# Patient Record
Sex: FEMALE | Race: OTHER | NOT HISPANIC OR LATINO
[De-identification: names, ages, dates, MRNs, and addresses within clinical notes are randomized per-mention and may not be internally consistent; named-entity substitution may affect disease eponyms.]

---
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
## 2019-03-07 ENCOUNTER — TRANSCRIPTION ENCOUNTER (OUTPATIENT)
Age: 19
End: 2019-03-07

## 2019-11-14 ENCOUNTER — TRANSCRIPTION ENCOUNTER (OUTPATIENT)
Age: 19
End: 2019-11-14

## 2019-11-17 ENCOUNTER — TRANSCRIPTION ENCOUNTER (OUTPATIENT)
Age: 19
End: 2019-11-17

## 2019-12-06 ENCOUNTER — TRANSCRIPTION ENCOUNTER (OUTPATIENT)
Age: 19
End: 2019-12-06

## 2020-01-16 ENCOUNTER — TRANSCRIPTION ENCOUNTER (OUTPATIENT)
Age: 20
End: 2020-01-16

## 2020-03-09 ENCOUNTER — TRANSCRIPTION ENCOUNTER (OUTPATIENT)
Age: 20
End: 2020-03-09

## 2020-05-21 ENCOUNTER — INPATIENT (INPATIENT)
Facility: HOSPITAL | Age: 20
LOS: 6 days | Discharge: HOME | DRG: 871 | End: 2020-05-28
Attending: HOSPITALIST | Admitting: INTERNAL MEDICINE
Payer: COMMERCIAL

## 2020-05-21 VITALS
OXYGEN SATURATION: 98 % | RESPIRATION RATE: 16 BRPM | SYSTOLIC BLOOD PRESSURE: 110 MMHG | DIASTOLIC BLOOD PRESSURE: 62 MMHG | TEMPERATURE: 99 F | HEART RATE: 110 BPM

## 2020-05-21 DIAGNOSIS — J15.9 UNSPECIFIED BACTERIAL PNEUMONIA: ICD-10-CM

## 2020-05-21 DIAGNOSIS — R11.10 VOMITING, UNSPECIFIED: ICD-10-CM

## 2020-05-21 DIAGNOSIS — A41.9 SEPSIS, UNSPECIFIED ORGANISM: ICD-10-CM

## 2020-05-21 DIAGNOSIS — R19.7 DIARRHEA, UNSPECIFIED: ICD-10-CM

## 2020-05-21 DIAGNOSIS — E87.3 ALKALOSIS: ICD-10-CM

## 2020-05-21 DIAGNOSIS — Z29.9 ENCOUNTER FOR PROPHYLACTIC MEASURES, UNSPECIFIED: ICD-10-CM

## 2020-05-21 DIAGNOSIS — Z90.89 ACQUIRED ABSENCE OF OTHER ORGANS: Chronic | ICD-10-CM

## 2020-05-21 DIAGNOSIS — R94.31 ABNORMAL ELECTROCARDIOGRAM [ECG] [EKG]: ICD-10-CM

## 2020-05-21 DIAGNOSIS — U07.1 COVID-19: ICD-10-CM

## 2020-05-21 DIAGNOSIS — F19.10 OTHER PSYCHOACTIVE SUBSTANCE ABUSE, UNCOMPLICATED: ICD-10-CM

## 2020-05-21 DIAGNOSIS — R63.8 OTHER SYMPTOMS AND SIGNS CONCERNING FOOD AND FLUID INTAKE: ICD-10-CM

## 2020-05-21 DIAGNOSIS — F41.9 ANXIETY DISORDER, UNSPECIFIED: ICD-10-CM

## 2020-05-21 LAB
ALBUMIN SERPL ELPH-MCNC: 3.5 G/DL — SIGNIFICANT CHANGE UP (ref 3.3–5)
ALP SERPL-CCNC: 91 U/L — SIGNIFICANT CHANGE UP (ref 40–120)
ALT FLD-CCNC: <5 U/L — LOW (ref 10–45)
ANION GAP SERPL CALC-SCNC: 16 MMOL/L — SIGNIFICANT CHANGE UP (ref 5–17)
APPEARANCE UR: CLEAR — SIGNIFICANT CHANGE UP
AST SERPL-CCNC: 21 U/L — SIGNIFICANT CHANGE UP (ref 10–40)
BASOPHILS # BLD AUTO: 0.02 K/UL — SIGNIFICANT CHANGE UP (ref 0–0.2)
BASOPHILS NFR BLD AUTO: 0.1 % — SIGNIFICANT CHANGE UP (ref 0–2)
BILIRUB SERPL-MCNC: 0.4 MG/DL — SIGNIFICANT CHANGE UP (ref 0.2–1.2)
BILIRUB UR-MCNC: NEGATIVE — SIGNIFICANT CHANGE UP
BUN SERPL-MCNC: 5 MG/DL — LOW (ref 7–23)
CALCIUM SERPL-MCNC: 9 MG/DL — SIGNIFICANT CHANGE UP (ref 8.4–10.5)
CHLORIDE SERPL-SCNC: 100 MMOL/L — SIGNIFICANT CHANGE UP (ref 96–108)
CO2 SERPL-SCNC: 18 MMOL/L — LOW (ref 22–31)
COLOR SPEC: YELLOW — SIGNIFICANT CHANGE UP
CREAT SERPL-MCNC: 0.65 MG/DL — SIGNIFICANT CHANGE UP (ref 0.5–1.3)
CRP SERPL-MCNC: 27.72 MG/DL — HIGH (ref 0–0.4)
D DIMER BLD IA.RAPID-MCNC: 479 NG/ML DDU — HIGH
DIFF PNL FLD: ABNORMAL
EOSINOPHIL # BLD AUTO: 0 K/UL — SIGNIFICANT CHANGE UP (ref 0–0.5)
EOSINOPHIL NFR BLD AUTO: 0 % — SIGNIFICANT CHANGE UP (ref 0–6)
FERRITIN SERPL-MCNC: 360 NG/ML — HIGH (ref 15–150)
GLUCOSE SERPL-MCNC: 88 MG/DL — SIGNIFICANT CHANGE UP (ref 70–99)
GLUCOSE UR QL: NEGATIVE — SIGNIFICANT CHANGE UP
HCG SERPL-ACNC: <0 MIU/ML — SIGNIFICANT CHANGE UP
HCT VFR BLD CALC: 37.4 % — SIGNIFICANT CHANGE UP (ref 34.5–45)
HGB BLD-MCNC: 12.5 G/DL — SIGNIFICANT CHANGE UP (ref 11.5–15.5)
IMM GRANULOCYTES NFR BLD AUTO: 0.6 % — SIGNIFICANT CHANGE UP (ref 0–1.5)
KETONES UR-MCNC: >=80 MG/DL
LACTATE SERPL-SCNC: 0.7 MMOL/L — SIGNIFICANT CHANGE UP (ref 0.5–2)
LEGIONELLA AG UR QL: NEGATIVE — SIGNIFICANT CHANGE UP
LEUKOCYTE ESTERASE UR-ACNC: NEGATIVE — SIGNIFICANT CHANGE UP
LIDOCAIN IGE QN: 11 U/L — SIGNIFICANT CHANGE UP (ref 7–60)
LYMPHOCYTES # BLD AUTO: 0.87 K/UL — LOW (ref 1–3.3)
LYMPHOCYTES # BLD AUTO: 4.8 % — LOW (ref 13–44)
MCHC RBC-ENTMCNC: 30.9 PG — SIGNIFICANT CHANGE UP (ref 27–34)
MCHC RBC-ENTMCNC: 33.4 GM/DL — SIGNIFICANT CHANGE UP (ref 32–36)
MCV RBC AUTO: 92.3 FL — SIGNIFICANT CHANGE UP (ref 80–100)
MONOCYTES # BLD AUTO: 0.35 K/UL — SIGNIFICANT CHANGE UP (ref 0–0.9)
MONOCYTES NFR BLD AUTO: 1.9 % — LOW (ref 2–14)
NEUTROPHILS # BLD AUTO: 16.95 K/UL — HIGH (ref 1.8–7.4)
NEUTROPHILS NFR BLD AUTO: 92.6 % — HIGH (ref 43–77)
NITRITE UR-MCNC: NEGATIVE — SIGNIFICANT CHANGE UP
NRBC # BLD: 0 /100 WBCS — SIGNIFICANT CHANGE UP (ref 0–0)
PH UR: 6 — SIGNIFICANT CHANGE UP (ref 5–8)
PLATELET # BLD AUTO: 297 K/UL — SIGNIFICANT CHANGE UP (ref 150–400)
POTASSIUM SERPL-MCNC: 3.6 MMOL/L — SIGNIFICANT CHANGE UP (ref 3.5–5.3)
POTASSIUM SERPL-SCNC: 3.6 MMOL/L — SIGNIFICANT CHANGE UP (ref 3.5–5.3)
PROCALCITONIN SERPL-MCNC: 0.29 NG/ML — HIGH (ref 0.02–0.1)
PROT SERPL-MCNC: 7 G/DL — SIGNIFICANT CHANGE UP (ref 6–8.3)
PROT UR-MCNC: ABNORMAL MG/DL
RAPID RVP RESULT: SIGNIFICANT CHANGE UP
RBC # BLD: 4.05 M/UL — SIGNIFICANT CHANGE UP (ref 3.8–5.2)
RBC # FLD: 12.2 % — SIGNIFICANT CHANGE UP (ref 10.3–14.5)
SARS-COV-2 RNA SPEC QL NAA+PROBE: SIGNIFICANT CHANGE UP
SODIUM SERPL-SCNC: 134 MMOL/L — LOW (ref 135–145)
SP GR SPEC: >=1.03 — SIGNIFICANT CHANGE UP (ref 1–1.03)
UROBILINOGEN FLD QL: 0.2 E.U./DL — SIGNIFICANT CHANGE UP
WBC # BLD: 18.3 K/UL — HIGH (ref 3.8–10.5)
WBC # FLD AUTO: 18.3 K/UL — HIGH (ref 3.8–10.5)

## 2020-05-21 PROCEDURE — 99223 1ST HOSP IP/OBS HIGH 75: CPT | Mod: GC

## 2020-05-21 PROCEDURE — 71045 X-RAY EXAM CHEST 1 VIEW: CPT | Mod: 26

## 2020-05-21 PROCEDURE — 99285 EMERGENCY DEPT VISIT HI MDM: CPT

## 2020-05-21 PROCEDURE — 93010 ELECTROCARDIOGRAM REPORT: CPT

## 2020-05-21 RX ORDER — PANTOPRAZOLE SODIUM 20 MG/1
40 TABLET, DELAYED RELEASE ORAL ONCE
Refills: 0 | Status: COMPLETED | OUTPATIENT
Start: 2020-05-21 | End: 2020-05-21

## 2020-05-21 RX ORDER — CEFTRIAXONE 500 MG/1
1000 INJECTION, POWDER, FOR SOLUTION INTRAMUSCULAR; INTRAVENOUS EVERY 24 HOURS
Refills: 0 | Status: DISCONTINUED | OUTPATIENT
Start: 2020-05-22 | End: 2020-05-22

## 2020-05-21 RX ORDER — BENZOCAINE AND MENTHOL 5; 1 G/100ML; G/100ML
1 LIQUID ORAL ONCE
Refills: 0 | Status: COMPLETED | OUTPATIENT
Start: 2020-05-21 | End: 2020-05-21

## 2020-05-21 RX ORDER — GUAIFENESIN/DEXTROMETHORPHAN 600MG-30MG
10 TABLET, EXTENDED RELEASE 12 HR ORAL EVERY 6 HOURS
Refills: 0 | Status: DISCONTINUED | OUTPATIENT
Start: 2020-05-21 | End: 2020-05-22

## 2020-05-21 RX ORDER — ONDANSETRON 8 MG/1
4 TABLET, FILM COATED ORAL EVERY 8 HOURS
Refills: 0 | Status: DISCONTINUED | OUTPATIENT
Start: 2020-05-21 | End: 2020-05-22

## 2020-05-21 RX ORDER — AZITHROMYCIN 500 MG/1
250 TABLET, FILM COATED ORAL EVERY 24 HOURS
Refills: 0 | Status: DISCONTINUED | OUTPATIENT
Start: 2020-05-22 | End: 2020-05-22

## 2020-05-21 RX ORDER — CEFTRIAXONE 500 MG/1
1000 INJECTION, POWDER, FOR SOLUTION INTRAMUSCULAR; INTRAVENOUS ONCE
Refills: 0 | Status: COMPLETED | OUTPATIENT
Start: 2020-05-21 | End: 2020-05-21

## 2020-05-21 RX ORDER — SODIUM CHLORIDE 9 MG/ML
1000 INJECTION, SOLUTION INTRAVENOUS
Refills: 0 | Status: DISCONTINUED | OUTPATIENT
Start: 2020-05-21 | End: 2020-05-22

## 2020-05-21 RX ORDER — LANOLIN ALCOHOL/MO/W.PET/CERES
3 CREAM (GRAM) TOPICAL AT BEDTIME
Refills: 0 | Status: DISCONTINUED | OUTPATIENT
Start: 2020-05-21 | End: 2020-05-24

## 2020-05-21 RX ORDER — FAMOTIDINE 10 MG/ML
20 INJECTION INTRAVENOUS ONCE
Refills: 0 | Status: COMPLETED | OUTPATIENT
Start: 2020-05-21 | End: 2020-05-21

## 2020-05-21 RX ORDER — KETOROLAC TROMETHAMINE 30 MG/ML
30 SYRINGE (ML) INJECTION ONCE
Refills: 0 | Status: DISCONTINUED | OUTPATIENT
Start: 2020-05-21 | End: 2020-05-21

## 2020-05-21 RX ORDER — CLONAZEPAM 1 MG
1 TABLET ORAL DAILY
Refills: 0 | Status: DISCONTINUED | OUTPATIENT
Start: 2020-05-21 | End: 2020-05-27

## 2020-05-21 RX ORDER — ACETAMINOPHEN 500 MG
1000 TABLET ORAL ONCE
Refills: 0 | Status: DISCONTINUED | OUTPATIENT
Start: 2020-05-21 | End: 2020-05-21

## 2020-05-21 RX ORDER — ENOXAPARIN SODIUM 100 MG/ML
40 INJECTION SUBCUTANEOUS EVERY 24 HOURS
Refills: 0 | Status: DISCONTINUED | OUTPATIENT
Start: 2020-05-21 | End: 2020-05-22

## 2020-05-21 RX ORDER — SODIUM CHLORIDE 9 MG/ML
2000 INJECTION INTRAMUSCULAR; INTRAVENOUS; SUBCUTANEOUS ONCE
Refills: 0 | Status: COMPLETED | OUTPATIENT
Start: 2020-05-21 | End: 2020-05-21

## 2020-05-21 RX ORDER — ONDANSETRON 8 MG/1
4 TABLET, FILM COATED ORAL ONCE
Refills: 0 | Status: COMPLETED | OUTPATIENT
Start: 2020-05-21 | End: 2020-05-21

## 2020-05-21 RX ORDER — IBUPROFEN 200 MG
600 TABLET ORAL ONCE
Refills: 0 | Status: DISCONTINUED | OUTPATIENT
Start: 2020-05-21 | End: 2020-05-21

## 2020-05-21 RX ORDER — ESCITALOPRAM OXALATE 10 MG/1
20 TABLET, FILM COATED ORAL EVERY 24 HOURS
Refills: 0 | Status: DISCONTINUED | OUTPATIENT
Start: 2020-05-21 | End: 2020-05-22

## 2020-05-21 RX ORDER — AZITHROMYCIN 500 MG/1
500 TABLET, FILM COATED ORAL ONCE
Refills: 0 | Status: COMPLETED | OUTPATIENT
Start: 2020-05-21 | End: 2020-05-21

## 2020-05-21 RX ADMIN — Medication 30 MILLIGRAM(S): at 15:01

## 2020-05-21 RX ADMIN — Medication 3 MILLIGRAM(S): at 21:25

## 2020-05-21 RX ADMIN — ENOXAPARIN SODIUM 40 MILLIGRAM(S): 100 INJECTION SUBCUTANEOUS at 17:36

## 2020-05-21 RX ADMIN — AZITHROMYCIN 255 MILLIGRAM(S): 500 TABLET, FILM COATED ORAL at 15:53

## 2020-05-21 RX ADMIN — Medication 0.5 MILLIGRAM(S): at 15:01

## 2020-05-21 RX ADMIN — Medication 204 MILLIGRAM(S): at 16:26

## 2020-05-21 RX ADMIN — PANTOPRAZOLE SODIUM 40 MILLIGRAM(S): 20 TABLET, DELAYED RELEASE ORAL at 15:01

## 2020-05-21 RX ADMIN — ONDANSETRON 4 MILLIGRAM(S): 8 TABLET, FILM COATED ORAL at 12:57

## 2020-05-21 RX ADMIN — SODIUM CHLORIDE 90 MILLILITER(S): 9 INJECTION, SOLUTION INTRAVENOUS at 18:23

## 2020-05-21 RX ADMIN — SODIUM CHLORIDE 1000 MILLILITER(S): 9 INJECTION INTRAMUSCULAR; INTRAVENOUS; SUBCUTANEOUS at 12:56

## 2020-05-21 RX ADMIN — Medication 1 MILLIGRAM(S): at 22:24

## 2020-05-21 RX ADMIN — FAMOTIDINE 20 MILLIGRAM(S): 10 INJECTION INTRAVENOUS at 15:00

## 2020-05-21 RX ADMIN — BENZOCAINE AND MENTHOL 1 LOZENGE: 5; 1 LIQUID ORAL at 22:22

## 2020-05-21 RX ADMIN — Medication 100 MILLIGRAM(S): at 22:22

## 2020-05-21 RX ADMIN — CEFTRIAXONE 1000 MILLIGRAM(S): 500 INJECTION, POWDER, FOR SOLUTION INTRAMUSCULAR; INTRAVENOUS at 15:00

## 2020-05-21 RX ADMIN — CEFTRIAXONE 100 MILLIGRAM(S): 500 INJECTION, POWDER, FOR SOLUTION INTRAMUSCULAR; INTRAVENOUS at 14:21

## 2020-05-21 NOTE — H&P ADULT - ASSESSMENT
19 y/o F PMHx of Anxiety and Depression presents to the ED with fever, cough, chills, running nose, nausea, diarrhea, abd pain since past Monday - patient meeting Sepsis Criteria, source being attributed to pneumonia, possibly covid-19 with a superimposed bacterial process. Admitted to [facility] for further workup. 19 y/o F PMHx of Anxiety and Depression presents to the ED with fever, cough, chills, running nose, nausea, diarrhea, abd pain since past Monday - patient meeting Sepsis Criteria, source being attributed to pneumonia, possibly covid-19 with a superimposed bacterial process. Admitted to [facility] for further workup.

## 2020-05-21 NOTE — ED ADULT NURSE REASSESSMENT NOTE - NS ED NURSE REASSESS RESPONSE TO CARE
pt denies change in nausea from zofran administration/no change in status or condition
pt now speaking 5-10 words per breath, up from 2-4 earlier today/no change in status or condition

## 2020-05-21 NOTE — ED PROVIDER NOTE - PHYSICAL EXAMINATION
General: Patient is well developed and well nourished. Patient is alert and oriented to person, place and date. Patient is laying comfortably in stretcher and appears in no acute distress.  HEENT: Head is normocephalic and atraumatic. Pupils are equal, round and reactive to light and accommodation. Extraocular movements intact. No evidence of nystagmus, conjunctival injection, or scleral icterus. External ears symmetric and non-tender without evidence of discharge. Ear canals are clear without evidence of edema or erythema. Cone of light evident on tympanic membrade without evidence of erythema, retraction or bulge. No hemotympanum present bilaterally.  Nose is symmetric, non-tender, patent without evidence of discharge. Teeth in good repair. Uvula midline. Pharynx without erythema, edema, tonsillar enlargement or exudates.   Neck: Supple and nontender, with no evidence of lymphadenopathy. No cervical spinal tenderness. Full range of motion.  Heart: Regular rate and rhythm. No murmurs, rubs or gallops.   Lungs: Clear to auscultation bilaterally with equal chest expansion. No note of wheezes, rhonchi, rales. Equal chest expansion. No note of retractions.  Abdomen: Bowel sounds present in all four quadrants. Soft, non-tender, non-distended without signs of masses, rebound or guarding. No note of hepatosplenomegaly. No CVA tenderness bilaterally. Negative Salmeron sign. No pain present over McBurney's point.  Musculoskeletal: No edema, erythema, ecchymosis, atrophy or deformity. Full range of motion in all four extremities.  No clubbing or cyanosis. No point tenderness to palpation.   Neuro: Cranial nerves intact. GCS 15. Moving all extremities without discomfort. Sensation intact. Gait steady  Skin: Warm, dry and intact without evidence of rashes, bruising, pallor, jaundice or cyanosis.   Psych: Mood and affect appropriate. General: Patient is well developed and well nourished. Patient is alert and oriented to person, place and date. Patient is laying comfortably in stretcher and appears in no acute distress.  HEENT: Head is normocephalic and atraumatic. Pupils are equal, round and reactive to light and accommodation. Extraocular movements intact. No evidence of nystagmus, conjunctival injection, or scleral icterus. External ears symmetric and non-tender without evidence of discharge. Ear canals are clear without evidence of edema or erythema. Cone of light evident on tympanic membrane without evidence of erythema, retraction or bulge. No hemotympanum present bilaterally.  Nose is symmetric, non-tender, patent without evidence of discharge. Teeth in good repair. Uvula midline. Pharynx without erythema, edema, tonsillar enlargement or exudates.   Neck: Supple and nontender, with no evidence of lymphadenopathy. No cervical spinal tenderness. Full range of motion.  Heart: Regular rate and rhythm. No murmurs, rubs or gallops.   Lungs: Clear to auscultation bilaterally with equal chest expansion. No note of wheezes, rhonchi, rales. Equal chest expansion. No note of retractions.  Abdomen: Bowel sounds present in all four quadrants. Soft, non-tender, non-distended without signs of masses, rebound or guarding. No note of hepatosplenomegaly. No CVA tenderness bilaterally. Negative Salmeron sign. No pain present over McBurney's point.  Musculoskeletal: No edema, erythema, ecchymosis, atrophy or deformity. Full range of motion in all four extremities.  No clubbing or cyanosis. No point tenderness to palpation.   Neuro: Cranial nerves intact. GCS 15. Moving all extremities without discomfort. Sensation intact. Gait steady  Skin: Warm, dry and intact without evidence of rashes, bruising, pallor, jaundice or cyanosis.   Psych: Mood and affect appropriate.

## 2020-05-21 NOTE — H&P ADULT - PROBLEM SELECTOR PLAN 9
Prophylaxis: Lovenox 40mg sq daily  Dispo: COVID-RMF, pending r/o Hx of substance use with Marijuana. ISTOP - notable for hycodan and 2 orders of codeine filled recently in March.  - f/u urine toxicology

## 2020-05-21 NOTE — ED PROVIDER NOTE - OBJECTIVE STATEMENT
19 y/o F, PMHx of anxiety and depression presents to the ED with fever, cough, chills, running nose, nausea, diarrhea, abd pain since past Monday. Pt was seen at [facility] yesterday, had blood work, chest x-ray, CT of abd/pelvis, and diagnosed with pneumonia on both sides, COVID-19 swab was negative. Pt was discharged on abx, taking nausea medications. Today was 1st day she was able to tolerate fluid by mouth but continues to have diarrhea. No bloody/tarry stools. Has generalized abd pain and SOB, requesting Klonopin and oxygen. 21 y/o F, PMHx of anxiety and depression presents to the ED with fever, cough, chills, running nose, nausea, diarrhea, abd pain since past Monday. Pt was seen at [facility] yesterday, had blood work, chest x-ray, CT of abd/pelvis, and diagnosed with pneumonia on both sides, COVID-19 swab was negative. Pt was discharged on abx (augmentin and doxy) taking nausea medications. Today was 1st day she was able to tolerate fluid by mouth but continues to have diarrhea. No bloody/tarry stools. Has generalized abd pain and SOB, requesting Klonopin and oxygen. 21 y/o F, PMHx of anxiety and depression presents to the ED with fever, cough, chills, running nose, nausea, diarrhea, abd pain since past Monday. no sick contacts.  states she has not been out of the house. Pt was seen at [facility] yesterday, had blood work, chest x-ray, CT of abd/pelvis, and diagnosed with pneumonia on both sides, COVID-19 swab was negative. Pt was discharged on abx (augmentin and doxy) taking nausea medications (zofran). Today was 1st day she was able to tolerate fluid by mouth but continues to have diarrhea described as complete liquid. No bloody/tarry stools no change in urinary habits, no burning with urination. Has generalized abd pain and SOB feeling as if she cannot take a deep breath due to pain (described as . no chest pain palpitations no ick contacts

## 2020-05-21 NOTE — H&P ADULT - PROBLEM SELECTOR PLAN 2
- Patient presenting with 1 week of respiratory and gastrointestinal symptoms along with tactile fevers/chills.   - Patient was seen in [place] ED prior to admission and d/jesi with antibiotics. COVID-19 swab negative yesterday and also negative (prelim) today in ED.   - F/u final Covid-19 result   - Based on presenting symptoms and labwork (with lymphopenia and high CRP 27), can not fully exclude an acute coronavirus infection.  - CXR with focal left lower lobe infiltrate and CP blunting is more favorable of a bacterial process   - At this time, will treat patient as potential viral covid-19 infection with superimposed bacterial pneumonia.   - Patient is on room air with sat high 90s   - No treatment being pursued at this time given no O2 requirement.   - No toci at this time in light of bacterial process - Patient presenting with 1 week of respiratory and gastrointestinal symptoms along with tactile fevers/chills.   - Patient was seen in [place] ED prior to admission and d/jesi with antibiotics. COVID-19 swab negative yesterday and also negative (prelim) today in ED.   - Based on presenting symptoms and labwork (with lymphopenia and high CRP 27), can not fully exclude an acute coronavirus infection.  - F/u final Covid-19 result   - CXR with focal left lower lobe infiltrate and CP blunting is more favorable of a bacterial process   - At this time, will treat patient as potential viral covid-19 infection with superimposed bacterial pneumonia.   - Patient is on room air with sat high 90s   - No treatment being pursued at this time given no O2 requirement.   - No toci at this time in light of bacterial process

## 2020-05-21 NOTE — H&P ADULT - PROBLEM SELECTOR PLAN 8
F: None  E: Replete PRN  N: CLD  Prophylaxis: None, IMPROVE score 0  Dispo: COVID-RMF, pending r/o F: None  E: Replete PRN  N: CLD T wave inversion noted in EKG.  - f/u repeat EKG T wave inversion noted in EKG.  - f/u repeat EKG AM

## 2020-05-21 NOTE — H&P ADULT - PROBLEM SELECTOR PLAN 3
-   - CXR revealing of a left lower lobe consolidative process with a blunted CP angle that may be reflective of an associated effusion.   - At this time, will cover patient with Cef/Azithro for a period of 7 days and de-escalate as necessary   - F/u RVP to rule out atypical causes of pneumonia   - F/u Urine legionella and Strep Pneumo   - Consider D/cing Azithro if atypical causes are ruled out   - Obtain sputum culture if patient with productive cough and narrow accordingly - CXR revealing of a left lower lobe consolidative process with a blunted CP angle that may be reflective of an associated effusion. At this time, cannot exclude viral pneumonia  - At this time, will cover patient with Cef/Azithro for a period of 7 days and de-escalate as necessary   - F/u RVP to rule out atypical causes of pneumonia   - F/u Urine legionella and Strep Pneumo   - Consider D/cing Azithro if atypical causes are ruled out   - Obtain sputum culture if patient with productive cough and narrow accordingly

## 2020-05-21 NOTE — ED ADULT NURSE NOTE - OTHER COMPLAINTS
pt c.o cough, sob, fever since sunday. denies sick contacts.
Pt. received semisupine in bed. NAD. +C/D/I Dressing to Left Knee, +Heplock, +Accordion Drain. PT present bedside to perform PT evaluation in conjunction w/ OT evaluation.

## 2020-05-21 NOTE — H&P ADULT - NSHPSOCIALHISTORY_GEN_ALL_CORE
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

## 2020-05-21 NOTE — ED ADULT NURSE NOTE - NSFALLRSKUNASSIST_ED_ALL_ED
Patient states she felt better while on medrol dosepak,pain is returning. Would like to try meloxicam as indicated in last visit note.Asking for script to be sent to  [facility].    no

## 2020-05-21 NOTE — H&P ADULT - HISTORY OF PRESENT ILLNESS
21 y/o F, PMHx of anxiety and depression presents to the ED with fever, cough, chills, running nose, nausea, diarrhea, abd pain since past Monday    In the ED, vitals as follows: T: Afebrile | HR: 74-110bpm | BP: /52-62mmHg | RR: 16-18/min | SpO2: 98% RA   Labs significant for: WBC 18.3 (92% Neut Pred, Lymphopenia 4.8%), ESR 68, CRP 27, Lactate 0.7, Procal 0.29, D-dimer 479, Ferritin 360, Na 134, Bicarbonate 18, UA [High SG > 1.030, (+) ketones, (-) for infection]. COVID-19 (-). Pending RVP.   Imaging: CXR - Patchy consolidation left lower lobe likely pneumonia, with blunted CP angle.   EKG:     Patient was administered: Cef/Azithro, 2L NS, 0.5mg Ativan x1, IV Pepcid/4mg IV Zofran, 40mg IV Pantoprazole, 25mg Phenergan, and 30mg IV Ketorolac. 19 yo F with PMHx of anxiety and depression presents to the ED with fever, cough, chills, running nose, nausea, diarrhea, abd pain for four days. Patient has been self isolating and home and denies sick contacts. Of note, patient was seen at [facility] 5/20 and was worked up, COVID (-), dx with bilateral PNA, d/c'ed on augmentin/doxycyclin and zofran for nausea. However, patient has had difficulty tolerating PO and has only been able to tolerate some fluid today. c/o diarrhea described as complete liquid, denies blood or mucous. Review of systems is positive for nausea, SOB with some inspiratory tenderness sternal area with deep breath, abd pain, and diarrhea.    ED Vitals: T: Afebrile | HR: 74-110bpm | BP: /52-62mmHg | RR: 16-18/min | SpO2: 98% RA  ED Course: CXR: Patchy consolidation left lower lobe likely pneumonia. UA: negative. Procal 0.29. WBC: 18.3 (92.6% neutrophils). CRP 27.7. Ferritin 360. COVID Negative (prelim). Received 2L NS, zofran x1, 0.5mg ativan PO, toradol x1, pepcid x1, protonix x1, 25mg Phenergan, ceftriaxone 1g and azithromycin. 19 yo F with PMHx of anxiety and depression presents to the ED with fever, cough, chills, running nose, nausea, diarrhea, abd pain for four days. Patient has been self isolating and home and denies sick contacts. Of note, patient was seen at [facility] 5/20 and was worked up, COVID (-), dx with bilateral PNA, d/c'ed on augmentin/doxycyclin and zofran for nausea. However, patient has had difficulty tolerating PO and has only been able to tolerate some fluid today. She was only able to take one dose of the antibiotics at home. c/o diarrhea described as complete liquid, denies blood or mucous. Review of systems is positive for nausea, SOB with some inspiratory tenderness sternal area with deep breath, abd pain, and diarrhea.    ED Vitals: T: Afebrile | HR: 74-110bpm | BP: /52-62mmHg | RR: 16-18/min | SpO2: 98% RA  ED Course: CXR: Patchy consolidation left lower lobe likely pneumonia. UA: negative. Procal 0.29. WBC: 18.3 (92.6% neutrophils). CRP 27.7. Ferritin 360. COVID Negative (prelim). Received 2L NS, zofran x1, 0.5mg ativan PO, toradol x1, pepcid x1, protonix x1, 25mg Phenergan, ceftriaxone 1g and azithromycin.

## 2020-05-21 NOTE — H&P ADULT - PROBLEM SELECTOR PLAN 10
F: lactated ringers @ 90cc/hr overnight and will reassess in AM  E: Replete PRN for Mg <2 and K<4  N: CLD, advance as tolerated    Prophylaxis: Lovenox 40mg sq daily  Dispo: COVID-CARO, pending r/o

## 2020-05-21 NOTE — H&P ADULT - PROBLEM SELECTOR PLAN 6
F:  E:  N:  Prophylaxis:  Dispo: - Patient with Bicarbonate 18 in the setting of repeated vomiting episodes   - Suspect this is secondary to increased GI losses of HCL   - C/w symptom control with Zofran, ensure QTc checked   - Encourage PO

## 2020-05-21 NOTE — H&P ADULT - NSHPPHYSICALEXAM_GEN_ALL_CORE
PHYSICAL EXAM:  Constitutional: Patient seated comfortably in bed, of appropriate color, nutrition, and hydration.   HEENT: PERRLA, Normal Range of eye movement with no complaint of diplopia, Normal Hearing  Neck: No LAD, No JVD  Back: Normal spine flexure, No CVA tenderness  Respiratory: Normal air entry, Lungs clear to auscultation b/l w/o wheeze or crepitations.   Cardiovascular: S1 and S2 present - no additional abnormal sounds or murmurs. Normal rhythm and rate of pulse.   Gastrointestinal: BS+, soft, NT/ND  Extremities: No peripheral edema  Vascular: 2+ peripheral pulses  Neurological: A/O x 3, CN V-XII grossly intact.  Psychiatric: Normal mood, normal affect  Musculoskeletal: 5/5 strength b/l upper and lower extremities  Skin: No rashes .  VITAL SIGNS:  T(C): 36.6 (05-21-20 @ 16:04), Max: 37.3 (05-21-20 @ 14:22)  T(F): 97.8 (05-21-20 @ 16:04), Max: 99.2 (05-21-20 @ 14:22)  HR: 80 (05-21-20 @ 16:04) (74 - 110)  BP: 120/74 (05-21-20 @ 16:04) (96/52 - 120/74)  BP(mean): 66 (05-21-20 @ 14:22) (66 - 66)  RR: 18 (05-21-20 @ 16:04) (16 - 18)  SpO2: 97% (05-21-20 @ 16:04) (97% - 98%)  Wt(kg): --    PHYSICAL EXAM:  Constitutional: No acute distress, very uncomfortable appearing, answering questions   HEENT: PERRLA, EOMI, MMM  Neck: No LAD, No JVD  Respiratory: Slight crackles right lower lobe, no W/R/R. No accessory muscle use. Tolerating room air.  Cardiovascular: S1/S2, RRR, no M/R/G  Gastrointestinal: BS+, soft, NT/ND  Extremities: No peripheral edema, WWP  Vascular: 2+ peripheral pulses   Neurological: AAO x 3, CN V-XII grossly intact.

## 2020-05-21 NOTE — H&P ADULT - PROBLEM SELECTOR PLAN 5
- Four days of nausea and associated vomiting. Non bloody non bilious.   - c/w zofran PRN  - Encourage PO to replete losses

## 2020-05-21 NOTE — H&P ADULT - NSHPLABSRESULTS_GEN_ALL_CORE
12.5   18.30 )-----------( 297      ( 21 May 2020 13:15 )             37.4       05-21    134<L>  |  100  |  5<L>  ----------------------------<  88  3.6   |  18<L>  |  0.65    Ca    9.0      21 May 2020 13:15    TPro  7.0  /  Alb  3.5  /  TBili  0.4  /  DBili  x   /  AST  21  /  ALT  <5<L>  /  AlkPhos  91  05-21          Urinalysis Basic - ( 21 May 2020 13:46 )    Color: Yellow / Appearance: Clear / SG: >=1.030 / pH: x  Gluc: x / Ketone: >=80 mg/dL  / Bili: Negative / Urobili: 0.2 E.U./dL   Blood: x / Protein: Trace mg/dL / Nitrite: NEGATIVE   Leuk Esterase: NEGATIVE / RBC: < 5 /HPF / WBC < 5 /HPF   Sq Epi: x / Non Sq Epi: Many /HPF / Bacteria: Present /HPF    Lactate Trend  05-21 @ 14:24 Lactate:0.7       CARDIAC MARKERS ( 21 May 2020 13:15 )  x     / <0.01 ng/mL / 51 U/L / x     / x LABS:                        12.5   18.30 )-----------( 297      ( 21 May 2020 13:15 )             37.4     05-21    134<L>  |  100  |  5<L>  ----------------------------<  88  3.6   |  18<L>  |  0.65    Ca    9.0      21 May 2020 13:15    TPro  7.0  /  Alb  3.5  /  TBili  0.4  /  DBili  x   /  AST  21  /  ALT  <5<L>  /  AlkPhos  91  05-21      Urinalysis Basic - ( 21 May 2020 13:46 )    Color: Yellow / Appearance: Clear / SG: >=1.030 / pH: x  Gluc: x / Ketone: >=80 mg/dL  / Bili: Negative / Urobili: 0.2 E.U./dL   Blood: x / Protein: Trace mg/dL / Nitrite: NEGATIVE   Leuk Esterase: NEGATIVE / RBC: < 5 /HPF / WBC < 5 /HPF   Sq Epi: x / Non Sq Epi: Many /HPF / Bacteria: Present /HPF        Procalcitonin: Procalcitonin, Serum: 0.29 ng/mL (05-21-20 @ 13:15)    D-dimer: D-Dimer Assay, Quantitative: 479 ng/mL DDU (05-21-20 @ 13:15)    ESR: Sedimentation Rate, Erythrocyte: 68 mm/Hr (05-21-20 @ 13:15)    CRP: C-Reactive Protein, Serum: 27.72 mg/dL (05-21-20 @ 13:15)    LDH:   Ferritin: Ferritin, Serum: 360 ng/mL (05-21-20 @ 13:15)    Lactate: Lactate, Blood: 0.7 mmol/L (05-21-20 @ 14:24)  lc  Trop I: Troponin T, Serum: <0.01 ng/mL (05-21-20 @ 13:15)    Ck: Creatine Kinase, Serum: 51 U/L (05-21-20 @ 13:15)        COVID Labs  Full T cell subset:   G6PD:   Immunoglobulins panel:   Quantiferon Gold Tb:   Triglyceride level:               RADIOLOGY & ADDITIONAL TESTS: Reviewed.         Xray Chest 1 View AP/PA (05.21.20 @ 12:27) >    Patchy consolidation left lower lobe likely pneumonia.

## 2020-05-21 NOTE — ED ADULT NURSE REASSESSMENT NOTE - CONDITION
unchanged/pt reports feeling feverish temp 99.2, 2nd liter NS infusing, medications given as ordered

## 2020-05-21 NOTE — ED PROVIDER NOTE - ATTENDING CONTRIBUTION TO CARE
19 yo healthy F w hx of anxiety p/w 4 days cough, diarrhea, vomiting, myalgias, fever Tm 104 today s/p Tylenol and Ibuprofen, seen at [facility] yesterday and had workup including CXR, labs, CT ab/pelv, and negative COVID.  Slightly tachy on exam, 98% on RA, clear lungs, mild generalized ab ttp, more epigastric region, no r/g.   Plan repeat labs, ivf, supportive care and reassess.

## 2020-05-21 NOTE — H&P ADULT - PROBLEM SELECTOR PLAN 1
- Patient meeting 2/4 SIRS Criteria - Tachycardia > 90bpm and Leukocytosis > 12 with source presumed to be pneumonia, likely viral with superimposed bacterial component.   - Lactate 0.7   - No e/o end organ damage   - Patient initiated on Cef/Azithro - continue management as below  - Received 2L NS - adequate for sepsis resuscitation with normal perfusion exam thereafter.   - F/u Blood Cultures   - F/u RVP - Patient meeting 2/4 SIRS Criteria - Tachycardia > 90bpm and Leukocytosis > 12 with source presumed to be pneumonia, likely viral with superimposed bacterial component. RVP Negative. COVID prelim neg.  - Lactate 0.7   - No e/o end organ damage   - Received 2L NS - adequate for sepsis resuscitation with normal perfusion exam thereafter.   - F/u Blood Cultures   - Patient initiated on Cef/Azithro - continue management as below

## 2020-05-21 NOTE — ED PROVIDER NOTE - CLINICAL SUMMARY MEDICAL DECISION MAKING FREE TEXT BOX
21 y/o F, PMHx of anxiety and depression presents to the ED with fever, cough, nausea, diarrhea, abd pain since past Monday. Had an extensive care at [facility]. Was discharged home with bilateral pneumonia and probable COVID-19 although COVID-19 swab was negative. During exam, well appearing, poor eye contact, anxious, bilaterally clear lungs, tachycardic but regular heart rate. Generalize abd discomfort. Plan is lab, meds and re-evaluation.     Called [facility], received from ER by Dr. Harris; had white blood cell count of 90 000, neutrophils 92%, anion gap of 20, chest x-ray bilateral opacities clinical carrel pna, ct of abd/pelvis did not show intraabdominal pathology, rather ground patho in lungs zone 21 y/o F, PMHx of anxiety and depression presents to the ED with fever, cough, nausea, diarrhea, abd pain since past Monday. Had an extensive care at [facility]. Was discharged home with bilateral pneumonia and probable COVID-19 although COVID-19 swab was negative. Called [facility], received from ER by Dr. Harris; had white blood cell count of 90 000, neutrophils 92%, anion gap of 20, chest x-ray bilateral opacities clinical carrel pna, ct of abd/pelvis did not show intraabdominal pathology, rather ground patho in lungs zone. During exam, sick appearing, poor eye contact, anxious, bilaterally clear lungs, tachycardic but regular heart rate. Generalize abd discomfort. Plan is lab, meds and re-evaluation. believe pt has covid like symptoms with possible superimposed pneumonia. plan for admission for hydration. iv abx. pt agreeable to plan. 19 y/o F, PMHx of anxiety and depression presents to the ED with fever, cough, nausea, diarrhea, abd pain since past Monday. Had evaluation at [facility] yesterday and was discharged home with bilateral pneumonia and probable COVID-19 although COVID-19 swab was negative. pt instructed to take augmentin/doxy for pneumonia but unable to tolerate because of vomiting. Called [facility], received from ER by Dr. Harris; had white blood cell count of 90 000, neutrophils 92%, anion gap of 20, chest x-ray bilateral opacities clinical carrel pna, ct of abd/pelvis did not show intraabdominal pathology, rather ground glass opacities in lower in lungs zone. During exam, sick appearing, poor eye contact, anxious, bilaterally clear lungs, tachycardic but regular heart rate. Generalize abd discomfort, mostly epigastric region. Plan is lab, meds and re-evaluation. re-evaluation patient states mild improvement of symptoms, but still feeling unwell. believe pt has covid like symptoms with possible superimposed pneumonia. plan for admission for hydration. iv abx. pt agreeable to plan.

## 2020-05-21 NOTE — H&P ADULT - NSICDXPASTSURGICALHX_GEN_ALL_CORE_FT
PAST SURGICAL HISTORY:  No significant past surgical history PAST SURGICAL HISTORY:  History of tonsillectomy

## 2020-05-21 NOTE — H&P ADULT - PROBLEM SELECTOR PLAN 4
- Patient with Bicarbonate 18 in the setting of repeated vomiting episodes   - Suspect this is secondary to increased GI losses of HCL   - C/w symptom control with Zofran, ensure QTc checked   - Encourage PO Four days of loose stools. Non bloody, no mucous. Cannot rule-out infectious causes including COVID-19.  - f/u GI PCR  - Encourage PO to replete losses Four days of loose stools. Non bloody, no mucous. Cannot rule-out infectious causes including COVID-19, enterovirus, rhinovirus  - f/u GI PCR and RVP  - Will give IVF overnight to replete losses, lactated ringers @ 90cc/hr  - Encourage PO to replete losses

## 2020-05-21 NOTE — ED ADULT NURSE NOTE - OBJECTIVE STATEMENT
Pt presents to ER with c/o cough, SOB, fevers (highest today 104F), generalized upper abd pain, watery diarrhea with intermittent incontinence since Sunday. Pt states that she's been to 2 ERs, COVID negative both times but yesterday was dx with bilateral PNA and sent home with PO abx and SL zofran which has not helped pt. Pt states she hasn't been able to keep food or liquids down since Sunday, today has been able to drink some water. Pt endorses watery diarrhea, no blood noted, that she intermittently has incontinence with. Pt speaking 2-4 words with every breath, appears ill and tired. Pt states she's been self-isolating at home, has other roommates, had nexplanon explanted last Thurs but has not had sexual activity since then. Pt reports taking 2 "extra strength tylenol" today after taking temp of 104F. Pt reports temp has not been below 102F since Sunday.

## 2020-05-21 NOTE — H&P ADULT - PROBLEM SELECTOR PLAN 7
-c/w home medication lexapro 20mg daily  -holding home klonopin 1mg PRN until medication reconcillation home medications include lexapro 20mg daily and klonopin 1mg PRN. However patient endorses that she has not been taking home lexapro for weeks.  - Received 0.5mg oral ativan in ED  - Monitor need for anxiety medications and f/u collateral from psychiatrist home medications include lexapro 20mg daily and klonopin 1mg PRN. However patient endorses that she has not been taking home lexapro for weeks.  - Received 0.5mg oral ativan in ED  - Monitor need for anxiety medications and f/u collateral from psychiatrist  - c/w klonopin 1mg daily PRN

## 2020-05-21 NOTE — H&P ADULT - ATTENDING COMMENTS
Patient was seen and examined with the resident team today.  I agree with Dr. Bryan's assessment and plan with the following exceptions/additions:     Briefly, 21yo woman with a PMH of anxiety and depression who p/w constitutional symptoms and poor PO intake s/p a recent ED visit to [place], where she was diagnosed with multifocal CAP (discharged on Doxy and Augmentin), now returning to [facility] w/persistence in symptoms, predominately SOB and abdominal pain.  Met sepsis criteria on admission 2/2 LLL PNA; however, also having diarrhea.  Presentation also notable for hyponatremia and ketonuria.     -- RVP, Legionella UAg, Strep Ag, bcx  -- f/u final COVID swab  -- stool studies  -- c/w CTX and Azithromycin pending above  -- c/w maintenance fluids  -- Zofran PRN   -- repeat EKG  -- c/w Klonopin 1mg daily PRN (gets 10 day supplies per month)  -- would check urine tox   -- DVT PPx - Lovenox  -- Dispo - home once medically cleared     Latia Retana  [phone] Patient was seen and examined with the resident team today.  I agree with Dr. Bryan's assessment and plan with the following exceptions/additions:     Briefly, 21yo woman with a PMH of anxiety and depression who p/w constitutional symptoms and poor PO intake s/p a recent ED visit to [place], where she was diagnosed with multifocal CAP (discharged on Doxy and Augmentin), now returning to [facility] w/persistence in symptoms, predominately SOB and abdominal pain.  Met sepsis criteria on admission 2/2 LLL PNA; however, also having diarrhea.  Presentation also notable for hyponatremia and ketonuria.     -- RVP, Legionella UAg, Strep Ag, bcx  -- f/u final COVID swab  -- stool studies  -- c/w CTX and Azithromycin pending above  -- c/w maintenance fluids  -- Zofran PRN   -- repeat EKG  -- c/w Klonopin 1mg daily PRN (gets 10 day supplies per month)  -- would check urine tox   -- c/w home Lexapro for MDD  -- DVT PPx - Lovenox  -- Dispo - home once medically cleared     Latia Retana  [phone] Patient was seen and examined with the resident team today.  I agree with Dr. Bryan's assessment and plan with the following exceptions/additions:     Briefly, 21yo woman with a PMH of anxiety and depression who p/w constitutional symptoms and poor PO intake s/p a recent ED visit to [place], where she was diagnosed with multifocal CAP (discharged on Doxy and Augmentin), now returning to [facility] w/persistence in symptoms, predominately SOB and abdominal pain.  Met sepsis criteria on admission 2/2 LLL PNA; however, also having diarrhea.  Presentation also notable for hyponatremia and ketonuria.     -- RVP, Legionella UAg, Strep Ag, bcx  -- f/u final COVID swab  -- stool studies  -- c/w CTX and Azithromycin pending above  -- c/w maintenance fluids  -- Zofran PRN   -- repeat EKG  -- c/w Klonopin 1mg daily PRN (gets 10 day supplies per month)  -- would check urine tox   -- c/w home Lexapro for MDD  -- need UPT  -- DVT PPx - Lovenox  -- Dispo - home once medically cleared     Latia Retana  [phone]

## 2020-05-22 DIAGNOSIS — J96.01 ACUTE RESPIRATORY FAILURE WITH HYPOXIA: ICD-10-CM

## 2020-05-22 LAB
ALBUMIN SERPL ELPH-MCNC: 2.8 G/DL — LOW (ref 3.3–5)
ALP SERPL-CCNC: 56 U/L — SIGNIFICANT CHANGE UP (ref 40–120)
ALT FLD-CCNC: <5 U/L — LOW (ref 10–45)
ANION GAP SERPL CALC-SCNC: 17 MMOL/L — SIGNIFICANT CHANGE UP (ref 5–17)
AST SERPL-CCNC: 21 U/L — SIGNIFICANT CHANGE UP (ref 10–40)
BASOPHILS # BLD AUTO: 0.03 K/UL — SIGNIFICANT CHANGE UP (ref 0–0.2)
BASOPHILS NFR BLD AUTO: 0.2 % — SIGNIFICANT CHANGE UP (ref 0–2)
BILIRUB SERPL-MCNC: 0.4 MG/DL — SIGNIFICANT CHANGE UP (ref 0.2–1.2)
BUN SERPL-MCNC: 5 MG/DL — LOW (ref 7–23)
CALCIUM SERPL-MCNC: 8.2 MG/DL — LOW (ref 8.4–10.5)
CHLORIDE SERPL-SCNC: 105 MMOL/L — SIGNIFICANT CHANGE UP (ref 96–108)
CO2 SERPL-SCNC: 20 MMOL/L — LOW (ref 22–31)
CREAT SERPL-MCNC: 0.69 MG/DL — SIGNIFICANT CHANGE UP (ref 0.5–1.3)
CRP SERPL-MCNC: 22.05 MG/DL — HIGH (ref 0–0.4)
CULTURE RESULTS: SIGNIFICANT CHANGE UP
D DIMER BLD IA.RAPID-MCNC: 552 NG/ML DDU — HIGH
EOSINOPHIL # BLD AUTO: 0.01 K/UL — SIGNIFICANT CHANGE UP (ref 0–0.5)
EOSINOPHIL NFR BLD AUTO: 0.1 % — SIGNIFICANT CHANGE UP (ref 0–6)
FERRITIN SERPL-MCNC: 342 NG/ML — HIGH (ref 15–150)
GLUCOSE SERPL-MCNC: 88 MG/DL — SIGNIFICANT CHANGE UP (ref 70–99)
HCT VFR BLD CALC: 33.6 % — LOW (ref 34.5–45)
HGB BLD-MCNC: 11.1 G/DL — LOW (ref 11.5–15.5)
IMM GRANULOCYTES NFR BLD AUTO: 0.5 % — SIGNIFICANT CHANGE UP (ref 0–1.5)
LEGIONELLA AG UR QL: NEGATIVE — SIGNIFICANT CHANGE UP
LIDOCAIN IGE QN: 19 U/L — SIGNIFICANT CHANGE UP (ref 7–60)
LYMPHOCYTES # BLD AUTO: 1.05 K/UL — SIGNIFICANT CHANGE UP (ref 1–3.3)
LYMPHOCYTES # BLD AUTO: 5.3 % — LOW (ref 13–44)
MAGNESIUM SERPL-MCNC: 1.8 MG/DL — SIGNIFICANT CHANGE UP (ref 1.6–2.6)
MCHC RBC-ENTMCNC: 31 PG — SIGNIFICANT CHANGE UP (ref 27–34)
MCHC RBC-ENTMCNC: 33 GM/DL — SIGNIFICANT CHANGE UP (ref 32–36)
MCV RBC AUTO: 93.9 FL — SIGNIFICANT CHANGE UP (ref 80–100)
MONOCYTES # BLD AUTO: 0.35 K/UL — SIGNIFICANT CHANGE UP (ref 0–0.9)
MONOCYTES NFR BLD AUTO: 1.8 % — LOW (ref 2–14)
MRSA PCR RESULT.: NEGATIVE — SIGNIFICANT CHANGE UP
NEUTROPHILS # BLD AUTO: 18.41 K/UL — HIGH (ref 1.8–7.4)
NEUTROPHILS NFR BLD AUTO: 92.1 % — HIGH (ref 43–77)
NRBC # BLD: 0 /100 WBCS — SIGNIFICANT CHANGE UP (ref 0–0)
NT-PROBNP SERPL-SCNC: 577 PG/ML — HIGH (ref 0–300)
PCP SPEC-MCNC: SIGNIFICANT CHANGE UP
PHOSPHATE SERPL-MCNC: 2.2 MG/DL — LOW (ref 2.5–4.5)
PLATELET # BLD AUTO: 280 K/UL — SIGNIFICANT CHANGE UP (ref 150–400)
POTASSIUM SERPL-MCNC: 3.6 MMOL/L — SIGNIFICANT CHANGE UP (ref 3.5–5.3)
POTASSIUM SERPL-SCNC: 3.6 MMOL/L — SIGNIFICANT CHANGE UP (ref 3.5–5.3)
PROT SERPL-MCNC: 5.9 G/DL — LOW (ref 6–8.3)
RBC # BLD: 3.58 M/UL — LOW (ref 3.8–5.2)
RBC # FLD: 12.1 % — SIGNIFICANT CHANGE UP (ref 10.3–14.5)
S AUREUS DNA NOSE QL NAA+PROBE: POSITIVE
SARS-COV-2 IGG SERPL QL IA: NEGATIVE — SIGNIFICANT CHANGE UP
SARS-COV-2 IGM SERPL IA-ACNC: 0.1 INDEX — SIGNIFICANT CHANGE UP
SARS-COV-2 RNA SPEC QL NAA+PROBE: SIGNIFICANT CHANGE UP
SODIUM SERPL-SCNC: 142 MMOL/L — SIGNIFICANT CHANGE UP (ref 135–145)
SPECIMEN SOURCE: SIGNIFICANT CHANGE UP
WBC # BLD: 19.95 K/UL — HIGH (ref 3.8–10.5)
WBC # FLD AUTO: 19.95 K/UL — HIGH (ref 3.8–10.5)

## 2020-05-22 PROCEDURE — 99223 1ST HOSP IP/OBS HIGH 75: CPT | Mod: GC

## 2020-05-22 PROCEDURE — 71275 CT ANGIOGRAPHY CHEST: CPT | Mod: 26

## 2020-05-22 PROCEDURE — 99222 1ST HOSP IP/OBS MODERATE 55: CPT

## 2020-05-22 PROCEDURE — 99233 SBSQ HOSP IP/OBS HIGH 50: CPT | Mod: GC

## 2020-05-22 PROCEDURE — 93010 ELECTROCARDIOGRAM REPORT: CPT

## 2020-05-22 PROCEDURE — 74177 CT ABD & PELVIS W/CONTRAST: CPT | Mod: 26

## 2020-05-22 RX ORDER — CEFTRIAXONE 500 MG/1
2000 INJECTION, POWDER, FOR SOLUTION INTRAMUSCULAR; INTRAVENOUS EVERY 24 HOURS
Refills: 0 | Status: COMPLETED | OUTPATIENT
Start: 2020-05-22 | End: 2020-05-25

## 2020-05-22 RX ORDER — CEFTRIAXONE 500 MG/1
2000 INJECTION, POWDER, FOR SOLUTION INTRAMUSCULAR; INTRAVENOUS EVERY 24 HOURS
Refills: 0 | Status: DISCONTINUED | OUTPATIENT
Start: 2020-05-22 | End: 2020-05-22

## 2020-05-22 RX ORDER — ONDANSETRON 8 MG/1
4 TABLET, FILM COATED ORAL ONCE
Refills: 0 | Status: COMPLETED | OUTPATIENT
Start: 2020-05-22 | End: 2020-05-23

## 2020-05-22 RX ORDER — POTASSIUM CHLORIDE 20 MEQ
20 PACKET (EA) ORAL ONCE
Refills: 0 | Status: COMPLETED | OUTPATIENT
Start: 2020-05-22 | End: 2020-05-22

## 2020-05-22 RX ORDER — ONDANSETRON 8 MG/1
4 TABLET, FILM COATED ORAL EVERY 8 HOURS
Refills: 0 | Status: DISCONTINUED | OUTPATIENT
Start: 2020-05-22 | End: 2020-05-22

## 2020-05-22 RX ORDER — SODIUM CHLORIDE 9 MG/ML
1000 INJECTION, SOLUTION INTRAVENOUS
Refills: 0 | Status: DISCONTINUED | OUTPATIENT
Start: 2020-05-22 | End: 2020-05-22

## 2020-05-22 RX ORDER — MAGNESIUM SULFATE 500 MG/ML
1 VIAL (ML) INJECTION ONCE
Refills: 0 | Status: COMPLETED | OUTPATIENT
Start: 2020-05-22 | End: 2020-05-22

## 2020-05-22 RX ORDER — PIPERACILLIN AND TAZOBACTAM 4; .5 G/20ML; G/20ML
4.5 INJECTION, POWDER, LYOPHILIZED, FOR SOLUTION INTRAVENOUS EVERY 6 HOURS
Refills: 0 | Status: DISCONTINUED | OUTPATIENT
Start: 2020-05-22 | End: 2020-05-22

## 2020-05-22 RX ORDER — AZITHROMYCIN 500 MG/1
500 TABLET, FILM COATED ORAL EVERY 24 HOURS
Refills: 0 | Status: DISCONTINUED | OUTPATIENT
Start: 2020-05-22 | End: 2020-05-26

## 2020-05-22 RX ORDER — VANCOMYCIN HCL 1 G
1000 VIAL (EA) INTRAVENOUS ONCE
Refills: 0 | Status: COMPLETED | OUTPATIENT
Start: 2020-05-22 | End: 2020-05-22

## 2020-05-22 RX ORDER — ALBUTEROL 90 UG/1
1 AEROSOL, METERED ORAL EVERY 6 HOURS
Refills: 0 | Status: DISCONTINUED | OUTPATIENT
Start: 2020-05-22 | End: 2020-05-28

## 2020-05-22 RX ORDER — ACETAMINOPHEN 500 MG
650 TABLET ORAL EVERY 6 HOURS
Refills: 0 | Status: DISCONTINUED | OUTPATIENT
Start: 2020-05-22 | End: 2020-05-28

## 2020-05-22 RX ORDER — ACETAMINOPHEN 500 MG
650 TABLET ORAL EVERY 6 HOURS
Refills: 0 | Status: DISCONTINUED | OUTPATIENT
Start: 2020-05-22 | End: 2020-05-22

## 2020-05-22 RX ADMIN — Medication 25 MILLIGRAM(S): at 20:45

## 2020-05-22 RX ADMIN — Medication 3 MILLIGRAM(S): at 21:28

## 2020-05-22 RX ADMIN — Medication 650 MILLIGRAM(S): at 18:10

## 2020-05-22 RX ADMIN — Medication 100 MILLIGRAM(S): at 04:49

## 2020-05-22 RX ADMIN — Medication 10 MILLILITER(S): at 11:17

## 2020-05-22 RX ADMIN — AZITHROMYCIN 255 MILLIGRAM(S): 500 TABLET, FILM COATED ORAL at 21:27

## 2020-05-22 RX ADMIN — Medication 650 MILLIGRAM(S): at 03:02

## 2020-05-22 RX ADMIN — Medication 650 MILLIGRAM(S): at 11:25

## 2020-05-22 RX ADMIN — SODIUM CHLORIDE 100 MILLILITER(S): 9 INJECTION, SOLUTION INTRAVENOUS at 12:08

## 2020-05-22 RX ADMIN — Medication 250 MILLIGRAM(S): at 17:45

## 2020-05-22 RX ADMIN — SODIUM CHLORIDE 90 MILLILITER(S): 9 INJECTION, SOLUTION INTRAVENOUS at 04:48

## 2020-05-22 RX ADMIN — Medication 10 MILLILITER(S): at 00:00

## 2020-05-22 RX ADMIN — Medication 100 MILLIGRAM(S): at 15:45

## 2020-05-22 RX ADMIN — Medication 650 MILLIGRAM(S): at 23:53

## 2020-05-22 RX ADMIN — Medication 0.5 MILLIGRAM(S): at 13:15

## 2020-05-22 RX ADMIN — CEFTRIAXONE 2000 MILLIGRAM(S): 500 INJECTION, POWDER, FOR SOLUTION INTRAMUSCULAR; INTRAVENOUS at 15:45

## 2020-05-22 RX ADMIN — Medication 100 GRAM(S): at 07:46

## 2020-05-22 RX ADMIN — CEFTRIAXONE 100 MILLIGRAM(S): 500 INJECTION, POWDER, FOR SOLUTION INTRAMUSCULAR; INTRAVENOUS at 21:28

## 2020-05-22 RX ADMIN — PIPERACILLIN AND TAZOBACTAM 200 GRAM(S): 4; .5 INJECTION, POWDER, LYOPHILIZED, FOR SOLUTION INTRAVENOUS at 16:33

## 2020-05-22 RX ADMIN — Medication 20 MILLIEQUIVALENT(S): at 07:45

## 2020-05-22 NOTE — PROGRESS NOTE ADULT - PROBLEM SELECTOR PLAN 8
T wave inversion noted in EKG.  - f/u repeat EKG AM Hx of substance use with Marijuana. ISTOP - notable for hycodan and 2 orders of codeine filled recently in March.  - f/u urine toxicology

## 2020-05-22 NOTE — PROGRESS NOTE ADULT - ASSESSMENT
Pt is a 20F PMH anxiety and depression presenting on 5/21 with fever, cough, chills, running nose, nausea, diarrhea, abd pain x3 days. Possibly 2/2 COVID-19 and/or bacterial process. Initially admitted to [facility], now being transferred to  for planned bronchoscopy.    Neuro  - No active issues    Respiratory  #COVID pneumonia vs vaping-associated lung injury (BELGICA)  - Pt presents with fever/chills, cough; WBC 18, lymphopenic with ALC 0.87, CT chest with bilateral ground glass opacities  - Pt endorses marijuana vape pod use, most recently 3 days prior to admission  - COVID swab and antibody negative  - Currently saturating 93-96% on 3L NC, although desats to high 80s/low 90s with coughing on room air  - Hycodan, tessalon perle for cough suppression  - Plan for bronchoscopy on 5/22  - May need to intubate for bronchoscopy  - NPO at midnight    #Bacterial pneumonia  - CXR with LLL consolidative process  - f/u urine legionella, Strep pneumo  - Continue zosyn 4.5g q6h  - Consider restarting azithro for coverage of atypicals  - f/u blood cx    ID  - Management as above    Cardiovascular  - No active issues    GI  #Diarrhea  - Pt reports four days of loose stools, non-bloody with no mucus  - GI PCR negative  - f/u stool culture  - Continue to monitor    #Nausea  - Promethazine for nausea/vomiting    #Nutrition  - Pt unable to tolerate much PO  - Clear liquid diet  - NPO at midnight for bronchoscopy on 5/22    Renal  - BUN/Cr 5/0.69    #Metabolic acidosis  - Likely 2/2 GI losses through diarrhea  - Continue to monitor BMP    Heme  #Anemia  - Hgb 11.1  - Continue to monitor  - Maintain active type and screen    Psych  #Anxiety  - Pt klonopin 1mg daily PRN and is noncompliant with lexapro 20mg daily  - Continue klonopin 1mg daily PRN    Endocrine  - No active issues    F: None  E: Replete PRN for K<4, Mg<2  N: Clear liquid, NPO at midnight for bronch  DVT PPx: Holding Lovenox for bronchoscopy (may need biopsies)  GI PPx: None  Dispo:  Pt is a 20F PMH anxiety and depression presenting on 5/21 with fever, cough, chills, running nose, nausea, diarrhea, abd pain x3 days. Possibly 2/2 COVID-19 and/or bacterial process. Initially admitted to [facility], now being transferred to  for planned bronchoscopy.    Neuro  - No active issues    Respiratory  #COVID pneumonia vs vaping-associated lung injury (BELGICA)  - Pt presents with fever/chills, cough; WBC 18, lymphopenic with ALC 0.87, CT chest with bilateral ground glass opacities  - Pt endorses marijuana vape pod use, most recently 3 days prior to admission  - COVID swab and antibody negative  - Currently saturating 93-96% on 3L NC, although desats to high 80s/low 90s with coughing on room air  - Hycodan, tessalon perle for cough suppression  - Plan for bronchoscopy on 5/22  - May need to intubate for bronchoscopy  - NPO at midnight    #Bacterial pneumonia  - Pt febrile to 103.3F, tachycardic to 131, WBC 20 (3/4 SIRS criteria)  - CXR with LLL consolidative process  - MRSA swab negative  - f/u blood cx, urine legionella, strep pneumo  - Continue zosyn 4.5g q6h  - Consider restarting azithro for coverage of atypicals    ID  - Management as above    Cardiovascular  - No active issues    GI  #Diarrhea  - Pt reports four days of loose stools, non-bloody with no mucus  - GI PCR negative  - f/u stool culture  - Continue to monitor    #Nausea  - Promethazine for nausea/vomiting    #Nutrition  - Pt unable to tolerate much PO  - Clear liquid diet  - NPO at midnight for bronchoscopy on 5/22    Renal  - BUN/Cr 5/0.69    #Metabolic acidosis  - Likely 2/2 GI losses through diarrhea  - Continue to monitor BMP    Heme  #Anemia  - Hgb 11.1  - Continue to monitor  - Maintain active type and screen    Psych  #Anxiety  - Pt klonopin 1mg daily PRN and is noncompliant with lexapro 20mg daily  - Continue klonopin 1mg daily PRN    Endocrine  - No active issues    F: None  E: Replete PRN for K<4, Mg<2  N: Clear liquid, NPO at midnight for bronch  DVT PPx: Holding Lovenox for bronchoscopy (may need biopsies)  GI PPx: None  Dispo:

## 2020-05-22 NOTE — PROGRESS NOTE ADULT - PROBLEM SELECTOR PLAN 4
Four days of loose stools. Non bloody, no mucous. Cannot rule-out infectious causes including COVID-19, enterovirus, rhinovirus  - f/u GI PCR and RVP  - Will give IVF overnight to replete losses, lactated ringers @ 90cc/hr  - Encourage PO to replete losses    # Elevated AG Metabolic Acidosis  - Corrected AG 20. Ketonuria. DDx likely multifactorial starvation ketosis in setting of nausea, vomiting and bicarb losses in setting of diarrhea  - Encourage PO intake  - Daily CMP Four days of loose stools. Non bloody, no mucous. Cannot rule-out infectious causes including COVID-19, enterovirus, rhinovirus.   - RVP negative  - f/u GI PCR  - Will give IVF overnight to replete losses, lactated ringers @ 90cc/hr  - Encourage PO to replete losses    # Elevated AG Metabolic Acidosis  - Corrected AG 20. Ketonuria. DDx likely multifactorial starvation ketosis in setting of nausea, vomiting and bicarb losses in setting of diarrhea  - Encourage PO intake  - Daily CMP    # Metabolic alkalosis on admission  Present on admission. Patient with Bicarbonate 18 in the setting of repeated vomiting episodes   - Suspect this is secondary to increased GI losses of HCL   - C/w symptom control with Zofran, ensure QTc checked   - Encourage PO

## 2020-05-22 NOTE — CONSULT NOTE ADULT - PROBLEM SELECTOR RECOMMENDATION 9
She fulfills sepsis criteria with high fevers and leukocytosis. Given her CT findings, source is likely pulmonary if there is an infection. GI is also possible, but PCR and CT are not suggestive of such. COVID testing has been negative twice (albeit prelim results), with negative IgG. RVP is also negative and urine legionella is negative as well. Given that she is from the community, would continue with ceftriaxone and azithromycin to cover for common pathogens.   The CT scan is atypical for bacterial pneumonia though, given the peribronchovascular distribution that is present in both upper and lower lobes. If non-infectious, autoimmune and other atypical pathogens (PCP) are also possible. Some of these findings are likely confounded by IV fluid administration though, given the small pleural effusion and septal thickening concurrently seen with the GGO's. Bedside ultrasound also showed thick, fan-like b-lines bilaterally suggestive of pulmonary edema. Regarding autoimmune disease, she has no systemic manifestations or family hx suggestive. Alveolar hemorrhage may also be possible, but lower on the differential  She also has a history of e-cigarette use with marijuana cartridges (uses everyday, last use 2 days ago). While the symptom presentation in this context is highly suggestive of EVALI, need to rule out the other differentials specifically infections. Would hold off on steroids for now. If her clinical status becomes worse including oxygenation and/or radiographically, then will likely need a bronchoscopy with TBBx and BAL to r/o infx including atypicals such as PCP.     Recommend  - c/w azithromycin and ceftriaxone  - HIV test  - hold off on fluid administration unless unable to tolerate PO  - Autoimmune panel (TIFFANY, RF, CCP, Sjogren's,)  - Considering bronchoscopy tomorrow for cultures (including atypicals), cell composition, and r/o DAH if clinically she does not improve.

## 2020-05-22 NOTE — PROGRESS NOTE ADULT - SUBJECTIVE AND OBJECTIVE BOX
Transfer note from [facility] to   Hospital course:  21 yo F with PMHx of asthma, anxiety and depression presents to the ED with fever, cough, chills, running nose, nausea, diarrhea, abd pain for four days. Of note, patient was seen at [facility] 5/20 and was worked up, COVID (-), dx with bilateral PNA, d/c'ed on augmentin/doxycyclin and zofran for nausea. However, patient has had difficulty tolerating PO and has only been able to tolerate some fluid. In [facility] ED, CXR and elevated WBC to 18 concerning for bacterial infection superimposed on possible viral pneumonia. Patient started ceftriaxone and azithromycin.  Patient received 2L of IVF in ED and LR at 90cc/hr overnight as well as anti-nausea medication. Overnight patient had several coughing episodes that required tesselon perles, guaifenisin, and x1 hycodan. In AM, patient was 91 on room air and vitals notable for tachycardia. CT PE no pulmonary embolism, but potential worsening of now extensive groundglass opacity. COVID antibodies negative and swab prelim negative. Patient continued to have waxing and waning fever to 103 during day, placed on standing tylenol, antibiotics broadened to vancomycin (pending MRSA swab result) and zosyn. Pulm consulted for potential COVID intervention. Patient originally denied smoking history, but later endorsed marijuana vape pod use several times per week and last use 3 days ago. CT results possibly 2/2 vape-related lung injury. NPO at midnight and transfer to MICU for possible bronchoscopy in AM.    SUBJECTIVE: Patient seen and examined at bedside.    OBJECTIVE:    VITAL SIGNS:  ICU Vital Signs Last 24 Hrs  T(C): 37.7 (22 May 2020 16:26), Max: 39.6 (22 May 2020 11:24)  T(F): 99.8 (22 May 2020 16:26), Max: 103.3 (22 May 2020 11:24)  HR: 105 (22 May 2020 16:26) (60 - 131)  BP: 106/55 (22 May 2020 13:07) (106/55 - 117/66)  BP(mean): --  ABP: --  ABP(mean): --  RR: 18 (22 May 2020 16:26) (17 - 20)  SpO2: 97% (22 May 2020 16:26) (91% - 97%)        05-22 @ 07:01  -  05-22 @ 18:34  --------------------------------------------------------  IN: 0 mL / OUT: 1600 mL / NET: -1600 mL      CAPILLARY BLOOD GLUCOSE          PHYSICAL EXAM:  Per attending physician.    MEDICATIONS:  MEDICATIONS  (STANDING):  acetaminophen   Tablet .. 650 milliGRAM(s) Oral every 6 hours  ALBUTerol    90 MICROgram(s) HFA Inhaler 1 Puff(s) Inhalation every 6 hours  benzonatate 100 milliGRAM(s) Oral three times a day  melatonin 3 milliGRAM(s) Oral at bedtime  piperacillin/tazobactam IVPB.. 4.5 Gram(s) IV Intermittent every 6 hours    MEDICATIONS  (PRN):  clonazePAM  Tablet 1 milliGRAM(s) Oral daily PRN anxiety  hydrocodone/homatropine Syrup 5 milliLiter(s) Oral every 6 hours PRN Cough  LORazepam     Tablet 0.5 milliGRAM(s) Oral daily PRN Anxiety  promethazine 25 milliGRAM(s) Oral every 6 hours PRN nausea and vomiting      ALLERGIES:  Allergies    No Known Allergies    Intolerances        LABS:                        11.1   19.95 )-----------( 280      ( 22 May 2020 06:47 )             33.6     05-22    142  |  105  |  5<L>  ----------------------------<  88  3.6   |  20<L>  |  0.69    Ca    8.2<L>      22 May 2020 06:47  Phos  2.2     05-22  Mg     1.8     05-22    TPro  5.9<L>  /  Alb  2.8<L>  /  TBili  0.4  /  DBili  x   /  AST  21  /  ALT  <5<L>  /  AlkPhos  56  05-22      Urinalysis Basic - ( 21 May 2020 13:46 )    Color: Yellow / Appearance: Clear / SG: >=1.030 / pH: x  Gluc: x / Ketone: >=80 mg/dL  / Bili: Negative / Urobili: 0.2 E.U./dL   Blood: x / Protein: Trace mg/dL / Nitrite: NEGATIVE   Leuk Esterase: NEGATIVE / RBC: < 5 /HPF / WBC < 5 /HPF   Sq Epi: x / Non Sq Epi: Many /HPF / Bacteria: Present /HPF        RADIOLOGY & ADDITIONAL TESTS: Reviewed.

## 2020-05-22 NOTE — PROGRESS NOTE ADULT - PROBLEM SELECTOR PLAN 1
- Patient meeting 2/4 SIRS Criteria - Tachycardia > 90bpm and Leukocytosis > 12 with source presumed to be pneumonia, likely viral with superimposed bacterial component. RVP Negative. COVID prelim neg.  - Lactate 0.7   - No e/o end organ damage   - Received 2L NS - adequate for sepsis resuscitation with normal perfusion exam thereafter.   - F/u Blood Cultures   - Patient initiated on Cef/Azithro - continue management as below - Patient meeting 2/4 SIRS Criteria - Tachycardia > 90bpm and Leukocytosis > 12 with source presumed to be pneumonia, likely viral with superimposed bacterial component. RVP Negative.   - Lactate 0.7   - No e/o end organ damage   - Received 2L NS - adequate for sepsis resuscitation with normal perfusion exam thereafter.   - F/u Blood Cultures   - Patient initiated on Cef/Azithro - continue management as below

## 2020-05-22 NOTE — CONSULT NOTE ADULT - ATTENDING COMMENTS
21 yo F w known hx vaping marijuana (present on screen) admitted w fever, prominent GI sx, GGO on CT, mild hypoxemia.  Covid screen negative so far.  Agree w empiric antibiotics, but has already had few days of antibiotics, think vaping associated lung injury (EVALI) is most likely.  Unless cultures positive would favor starting steroid rx for EVALI.  Believe bronchoscopy is not likely to be diagnostic at this point.

## 2020-05-22 NOTE — CONSULT NOTE ADULT - SUBJECTIVE AND OBJECTIVE BOX
HPI:  21 yo F with hx of anxiety and depression, consult for acute dyspnea. Patient was seen and examined at bedside.  Per her, she developed vomiting, nausea, and diarrhea 5 days ago. This was followed by dyspnea and subjective fevers that developed one day after. She had been evaluated at an OSH and was diagnosed with b/l pneumonia (COVID - at the time) and was discharged on augmentin and doxycycline. Symptoms did not become better afterwards, which prompted her to come to [facility] ED.   She was found to have fevers of 102-103F and leukocytotic. She was diagnosed with sepsis 2/2 pneumonia and was given a dose of ceftriaxone and azithromycin.     Review of systems is positive for nausea, SOB with some inspiratory tenderness sternal area with deep breath, abd pain, and diarrhea.    All other components of the 10 point review of systems is negative aside from those mentioned above.    PMHx:  Anxiety and Depression    FHx:  HTN  Alcoholism  Dementia    Smoking history:  [ ] Current [ x] Former [ ] Never    Occupation:     VITAL SIGNS:  ICU Vital Signs Last 24 Hrs  T(C): 37.7 (22 May 2020 16:26), Max: 39.6 (22 May 2020 11:24)  T(F): 99.8 (22 May 2020 16:26), Max: 103.3 (22 May 2020 11:24)  HR: 105 (22 May 2020 16:26) (60 - 131)  BP: 106/55 (22 May 2020 13:07) (106/55 - 117/66)  BP(mean): --  ABP: --  ABP(mean): --  RR: 18 (22 May 2020 16:26) (17 - 20)  SpO2: 97% (22 May 2020 16:26) (91% - 97%)        05-22 @ 07:01  -  05-22 @ 18:30  --------------------------------------------------------  IN: 0 mL / OUT: 1600 mL / NET: -1600 mL      CAPILLARY BLOOD GLUCOSE          PHYSICAL EXAM:  Constitutional: resting comfortably in bed, NAD  HEENT: NC/AT; PERRL, anicteric sclera; no oropharyngeal erythema or exudates; MMM  Neck: supple, no JVD  Respiratory: CTA B/L, no W/R/R; respirations appear non-labored, speaking full sentences  Cardiovascular: +S1/S2, RRR  Gastrointestinal: abdomen soft, NT/ND; +BS x4  Extremities: WWP; no clubbing, cyanosis or edema  Vascular: 2+ radial, DP/PT and femoral pulses B/L      MEDICATIONS:  MEDICATIONS  (STANDING):  acetaminophen   Tablet .. 650 milliGRAM(s) Oral every 6 hours  ALBUTerol    90 MICROgram(s) HFA Inhaler 1 Puff(s) Inhalation every 6 hours  benzonatate 100 milliGRAM(s) Oral three times a day  melatonin 3 milliGRAM(s) Oral at bedtime  piperacillin/tazobactam IVPB.. 4.5 Gram(s) IV Intermittent every 6 hours    MEDICATIONS  (PRN):  clonazePAM  Tablet 1 milliGRAM(s) Oral daily PRN anxiety  hydrocodone/homatropine Syrup 5 milliLiter(s) Oral every 6 hours PRN Cough  LORazepam     Tablet 0.5 milliGRAM(s) Oral daily PRN Anxiety  promethazine 25 milliGRAM(s) Oral every 6 hours PRN nausea and vomiting      ALLERGIES:  Allergies    No Known Allergies    Intolerances        LABS:                        11.1   19.95 )-----------( 280      ( 22 May 2020 06:47 )             33.6     05-22    142  |  105  |  5<L>  ----------------------------<  88  3.6   |  20<L>  |  0.69    Ca    8.2<L>      22 May 2020 06:47  Phos  2.2     05-22  Mg     1.8     05-22    TPro  5.9<L>  /  Alb  2.8<L>  /  TBili  0.4  /  DBili  x   /  AST  21  /  ALT  <5<L>  /  AlkPhos  56  05-22      Urinalysis Basic - ( 21 May 2020 13:46 )    Color: Yellow / Appearance: Clear / SG: >=1.030 / pH: x  Gluc: x / Ketone: >=80 mg/dL  / Bili: Negative / Urobili: 0.2 E.U./dL   Blood: x / Protein: Trace mg/dL / Nitrite: NEGATIVE   Leuk Esterase: NEGATIVE / RBC: < 5 /HPF / WBC < 5 /HPF   Sq Epi: x / Non Sq Epi: Many /HPF / Bacteria: Present /HPF        RADIOLOGY & ADDITIONAL TESTS: HPI:  19 yo F with hx of anxiety and depression, consult for acute dyspnea. Patient was seen and examined at bedside.  Per her, she developed vomiting, nausea, and diarrhea 5 days ago. This was followed by dyspnea and subjective fevers that developed one day after. She had been evaluated at an OSH and was diagnosed with b/l pneumonia (COVID - at the time) and was discharged on augmentin and doxycycline. Symptoms did not become better afterwards, which prompted her to come to [facility] ED.   She was found to have fevers of 102-103F and leukocytotic. She was diagnosed with sepsis 2/2 pneumonia and was given a dose of ceftriaxone and azithromycin along with 3L of fluids. Fevers and leukocytosis have persisted, so abx was broadened to vancomycin and zosyn today. CT scan was obtained, which showed abnormalities as listed below. We are now consulted for these findings in the context of persistent dyspnea and sepsis.    Review of systems is positive for nausea, SOB with some inspiratory tenderness sternal area with deep breath, abd pain, and diarrhea.    All other components of the 10 point review of systems is negative aside from those mentioned above.    PMHx:  Anxiety and Depression    FHx:  HTN  Alcoholism  Dementia    Smoking history:  [ ] Current [ x] Former [ ] Never    Occupation:     VITAL SIGNS:  ICU Vital Signs Last 24 Hrs  T(C): 37.7 (22 May 2020 16:26), Max: 39.6 (22 May 2020 11:24)  T(F): 99.8 (22 May 2020 16:26), Max: 103.3 (22 May 2020 11:24)  HR: 105 (22 May 2020 16:26) (60 - 131)  BP: 106/55 (22 May 2020 13:07) (106/55 - 117/66)  BP(mean): --  ABP: --  ABP(mean): --  RR: 18 (22 May 2020 16:26) (17 - 20)  SpO2: 97% (22 May 2020 16:26) (91% - 97%)        05-22 @ 07:01  -  05-22 @ 18:30  --------------------------------------------------------  IN: 0 mL / OUT: 1600 mL / NET: -1600 mL      CAPILLARY BLOOD GLUCOSE      PHYSICAL EXAM:  Constitutional: In mild dyspnea, NAD  HEENT: NC/AT; PERRL, anicteric sclera; no oropharyngeal erythema or exudates; MMM  Neck: supple, no JVD  Respiratory: CTA B/L, no W/R/R; respirations appear non-labored, speaking full sentences  Cardiovascular: +S1/S2, RRR  Gastrointestinal: abdomen soft, NT/ND; +BS x4  Extremities: WWP; no clubbing, cyanosis or edema  Vascular: 2+ radial, DP/PT and femoral pulses B/L      MEDICATIONS:  MEDICATIONS  (STANDING):  acetaminophen   Tablet .. 650 milliGRAM(s) Oral every 6 hours  ALBUTerol    90 MICROgram(s) HFA Inhaler 1 Puff(s) Inhalation every 6 hours  benzonatate 100 milliGRAM(s) Oral three times a day  melatonin 3 milliGRAM(s) Oral at bedtime  piperacillin/tazobactam IVPB.. 4.5 Gram(s) IV Intermittent every 6 hours    MEDICATIONS  (PRN):  clonazePAM  Tablet 1 milliGRAM(s) Oral daily PRN anxiety  hydrocodone/homatropine Syrup 5 milliLiter(s) Oral every 6 hours PRN Cough  LORazepam     Tablet 0.5 milliGRAM(s) Oral daily PRN Anxiety  promethazine 25 milliGRAM(s) Oral every 6 hours PRN nausea and vomiting      ALLERGIES:  Allergies    No Known Allergies    Intolerances        LABS:                        11.1   19.95 )-----------( 280      ( 22 May 2020 06:47 )             33.6     05-22    142  |  105  |  5<L>  ----------------------------<  88  3.6   |  20<L>  |  0.69    Ca    8.2<L>      22 May 2020 06:47  Phos  2.2     05-22  Mg     1.8     05-22    TPro  5.9<L>  /  Alb  2.8<L>  /  TBili  0.4  /  DBili  x   /  AST  21  /  ALT  <5<L>  /  AlkPhos  56  05-22      Urinalysis Basic - ( 21 May 2020 13:46 )    Color: Yellow / Appearance: Clear / SG: >=1.030 / pH: x  Gluc: x / Ketone: >=80 mg/dL  / Bili: Negative / Urobili: 0.2 E.U./dL   Blood: x / Protein: Trace mg/dL / Nitrite: NEGATIVE   Leuk Esterase: NEGATIVE / RBC: < 5 /HPF / WBC < 5 /HPF   Sq Epi: x / Non Sq Epi: Many /HPF / Bacteria: Present /HPF        RADIOLOGY & ADDITIONAL TESTS: HPI:  19 yo F with hx of anxiety and depression, consult for acute dyspnea. Patient was seen and examined at bedside.  Per her, she developed vomiting, nausea, and diarrhea 5 days ago. This was followed by dyspnea and subjective fevers that developed one day after. She had been evaluated at an OSH and was diagnosed with b/l pneumonia (COVID - at the time) and was discharged on augmentin and doxycycline. Symptoms did not become better afterwards, which prompted her to come to [facility] ED.   She was found to have fevers of 102-103F and leukocytotic. She was diagnosed with sepsis 2/2 pneumonia and was given a dose of ceftriaxone and azithromycin along with 3L of fluids. Fevers and leukocytosis have persisted, so abx was broadened to vancomycin and zosyn today. CT scan was obtained, which showed abnormalities as listed below. We are now consulted for these findings in the context of persistent dyspnea and sepsis.    Review of systems is positive for nausea, SOB with some inspiratory tenderness sternal area with deep breath, abd pain, and diarrhea.    All other components of the 10 point review of systems is negative aside from those mentioned above.    PMHx:  Anxiety and Depression    FHx:  HTN  Alcoholism  Dementia    Smoking history:  [ ] Current [ x] Former [ ] Never    Occupation:     VITAL SIGNS:  ICU Vital Signs Last 24 Hrs  T(C): 37.7 (22 May 2020 16:26), Max: 39.6 (22 May 2020 11:24)  T(F): 99.8 (22 May 2020 16:26), Max: 103.3 (22 May 2020 11:24)  HR: 105 (22 May 2020 16:26) (60 - 131)  BP: 106/55 (22 May 2020 13:07) (106/55 - 117/66)  BP(mean): --  ABP: --  ABP(mean): --  RR: 18 (22 May 2020 16:26) (17 - 20)  SpO2: 97% (22 May 2020 16:26) (91% - 97%)        05-22 @ 07:01  -  05-22 @ 18:30  --------------------------------------------------------  IN: 0 mL / OUT: 1600 mL / NET: -1600 mL      CAPILLARY BLOOD GLUCOSE      PHYSICAL EXAM:  Constitutional: In mild dyspnea, NAD  HEENT: NC/AT; PERRL, anicteric sclera; no oropharyngeal erythema or exudates; MMM  Neck: supple, no JVD  Respiratory: Crackles bilaterally in mid to lower lung field, no W/R/R; respirations appear non-labored, speaking full sentences  Cardiovascular: +S1/S2, RRR  Gastrointestinal: abdomen soft, NT/ND; +BS x4  Extremities: WWP; no clubbing, cyanosis or edema  Vascular: 2+ radial, DP/PT and femoral pulses B/L      MEDICATIONS:  MEDICATIONS  (STANDING):  acetaminophen   Tablet .. 650 milliGRAM(s) Oral every 6 hours  ALBUTerol    90 MICROgram(s) HFA Inhaler 1 Puff(s) Inhalation every 6 hours  benzonatate 100 milliGRAM(s) Oral three times a day  melatonin 3 milliGRAM(s) Oral at bedtime  piperacillin/tazobactam IVPB.. 4.5 Gram(s) IV Intermittent every 6 hours    MEDICATIONS  (PRN):  clonazePAM  Tablet 1 milliGRAM(s) Oral daily PRN anxiety  hydrocodone/homatropine Syrup 5 milliLiter(s) Oral every 6 hours PRN Cough  LORazepam     Tablet 0.5 milliGRAM(s) Oral daily PRN Anxiety  promethazine 25 milliGRAM(s) Oral every 6 hours PRN nausea and vomiting      ALLERGIES:  Allergies    No Known Allergies    Intolerances        LABS:                        11.1   19.95 )-----------( 280      ( 22 May 2020 06:47 )             33.6     05-22    142  |  105  |  5<L>  ----------------------------<  88  3.6   |  20<L>  |  0.69    Ca    8.2<L>      22 May 2020 06:47  Phos  2.2     05-22  Mg     1.8     05-22    TPro  5.9<L>  /  Alb  2.8<L>  /  TBili  0.4  /  DBili  x   /  AST  21  /  ALT  <5<L>  /  AlkPhos  56  05-22      Urinalysis Basic - ( 21 May 2020 13:46 )    Color: Yellow / Appearance: Clear / SG: >=1.030 / pH: x  Gluc: x / Ketone: >=80 mg/dL  / Bili: Negative / Urobili: 0.2 E.U./dL   Blood: x / Protein: Trace mg/dL / Nitrite: NEGATIVE   Leuk Esterase: NEGATIVE / RBC: < 5 /HPF / WBC < 5 /HPF   Sq Epi: x / Non Sq Epi: Many /HPF / Bacteria: Present /HPF        RADIOLOGY & ADDITIONAL TESTS:   CT scan reviewed - groundglass opacities seen bilaterally both in upper and lower lobes in predominantly a bronchocentric pattern. Small right sided effusion.  Intralobular septal thickening seen as well, more pronounced in the upper lobes.

## 2020-05-22 NOTE — PROGRESS NOTE ADULT - PROBLEM SELECTOR PLAN 6
- Patient with Bicarbonate 18 in the setting of repeated vomiting episodes   - Suspect this is secondary to increased GI losses of HCL   - C/w symptom control with Zofran, ensure QTc checked   - Encourage PO home medications include lexapro 20mg daily and klonopin 1mg PRN. However patient endorses that she has not been taking home lexapro for weeks.  - Received 0.5mg oral ativan in ED  - Monitor need for anxiety medications and f/u collateral from psychiatrist  - c/w klonopin 1mg daily PRN

## 2020-05-22 NOTE — PROGRESS NOTE ADULT - ASSESSMENT
21 y/o F PMHx of Anxiety and Depression presents to the ED with fever, cough, chills, running nose, nausea, diarrhea, abd pain since past Monday - patient meeting Sepsis Criteria, source being attributed to pneumonia, possibly COVID-19 with a superimposed bacterial process. Admitted to [email or web address] for further workup.

## 2020-05-22 NOTE — PROGRESS NOTE ADULT - SUBJECTIVE AND OBJECTIVE BOX
INTERVAL HPI/OVERNIGHT EVENTS: Febrile to 102.8 overnight. Received tylenol. Patient had episode of coughing fits that improved after tesselon perles, hycodan, and dextromethorphan/guaifensin.    SUBJECTIVE: Patient seen and examined at bedside. Patient was sleeping comfortably on room air when entered room. Patient endorses a good night sleep after the cough medications, but that the cough that she has on and off had come back overnight. Patient did not have any more episodes of vomiting or diarrhea overnight. Will try to have breakfast, but worried that she will lose it by vomiting. Still c/o of pleuritic chest pain with deep inspiration and subjective SOB with coughing fits. Fever overnight and associated sweats.     OBJECTIVE:    VITAL SIGNS:  ICU Vital Signs Last 24 Hrs  T(C): 37.8 (22 May 2020 05:05), Max: 39.3 (22 May 2020 02:35)  T(F): 100 (22 May 2020 05:05), Max: 102.8 (22 May 2020 02:35)  HR: 95 (22 May 2020 05:05) (60 - 110)  BP: 109/58 (22 May 2020 05:05) (96/52 - 120/74)  BP(mean): 66 (21 May 2020 14:22) (66 - 66)  ABP: --  ABP(mean): --  RR: 20 (22 May 2020 05:05) (16 - 20)  SpO2: 91% (22 May 2020 05:05) (91% - 98%)        CAPILLARY BLOOD GLUCOSE          PHYSICAL EXAM:  General: No acute distress, sleeping comfortably, but uncomfortable appearing, answering questions   HEENT: PERRLA, EOMI, MMM  Neck: No LAD, No JVD  Respiratory: Slight crackles right lower lobe, no W/R/R. No accessory muscle use. Tolerating room air. Occasional coughing fits, non-productive  Cardiovascular: S1/S2, tachycardic rate and regular rhythm, no M/R/G  Gastrointestinal: BS+, soft, NT/ND  Extremities: No peripheral edema, WWP  Vascular: 2+ peripheral pulses   Neurological: AAO x 3, CN V-XII grossly intact.        MEDICATIONS:  MEDICATIONS  (STANDING):  azithromycin  IVPB 250 milliGRAM(s) IV Intermittent every 24 hours  benzonatate 100 milliGRAM(s) Oral three times a day  cefTRIAXone Injectable. 1000 milliGRAM(s) IV Push every 24 hours  enoxaparin Injectable 40 milliGRAM(s) SubCutaneous every 24 hours  melatonin 3 milliGRAM(s) Oral at bedtime    MEDICATIONS  (PRN):  acetaminophen   Tablet .. 650 milliGRAM(s) Oral every 6 hours PRN Temp greater or equal to 38C (100.4F)  clonazePAM  Tablet 1 milliGRAM(s) Oral daily PRN anxiety  guaifenesin/dextromethorphan  Syrup 10 milliLiter(s) Oral every 6 hours PRN Cough  ondansetron   Disintegrating Tablet 4 milliGRAM(s) Oral every 8 hours PRN Nausea and/or Vomiting      ALLERGIES:  Allergies    No Known Allergies    Intolerances        LABS:                        11.1   19.95 )-----------( 280      ( 22 May 2020 06:47 )             33.6     05-22    142  |  105  |  5<L>  ----------------------------<  88  3.6   |  20<L>  |  0.69    Ca    8.2<L>      22 May 2020 06:47  Phos  2.2     05-22  Mg     1.8     05-22    TPro  5.9<L>  /  Alb  2.8<L>  /  TBili  0.4  /  DBili  x   /  AST  21  /  ALT  <5<L>  /  AlkPhos  56  05-22      Urinalysis Basic - ( 21 May 2020 13:46 )    Color: Yellow / Appearance: Clear / SG: >=1.030 / pH: x  Gluc: x / Ketone: >=80 mg/dL  / Bili: Negative / Urobili: 0.2 E.U./dL   Blood: x / Protein: Trace mg/dL / Nitrite: NEGATIVE   Leuk Esterase: NEGATIVE / RBC: < 5 /HPF / WBC < 5 /HPF   Sq Epi: x / Non Sq Epi: Many /HPF / Bacteria: Present /HPF        Procalcitonin: Procalcitonin, Serum: 0.29 ng/mL (05-21-20 @ 13:15)    D-dimer: D-Dimer Assay, Quantitative: 552 ng/mL DDU (05-22-20 @ 06:47)  D-Dimer Assay, Quantitative: 479 ng/mL DDU (05-21-20 @ 13:15)    ESR: Sedimentation Rate, Erythrocyte: 68 mm/Hr (05-21-20 @ 13:15)    CRP: C-Reactive Protein, Serum: 22.05 mg/dL (05-22-20 @ 06:47)  C-Reactive Protein, Serum: 27.72 mg/dL (05-21-20 @ 13:15)    LDH:   Ferritin: Ferritin, Serum: 342 ng/mL (05-22-20 @ 06:47)  Ferritin, Serum: 360 ng/mL (05-21-20 @ 13:15)    Lactate: Lactate, Blood: 0.7 mmol/L (05-21-20 @ 14:24)  lc  Trop I: Troponin T, Serum: <0.01 ng/mL (05-21-20 @ 13:15)    Ck: Creatine Kinase, Serum: 51 U/L (05-21-20 @ 13:15)        COVID Labs  Full T cell subset:   G6PD:   Immunoglobulins panel:   Quantiferon Gold Tb:   Triglyceride level:         Culture - Blood (collected 05-21-20 @ 15:02)  Source: .Blood Blood  Preliminary Report (05-22-20 @ 04:00):    No growth at 12 hours    Culture - Blood (collected 05-21-20 @ 15:02)  Source: .Blood Blood  Preliminary Report (05-22-20 @ 04:00):    No growth at 12 hours            RADIOLOGY & ADDITIONAL TESTS: Reviewed. ** Addendum for Hospital Course **      INTERVAL HPI/OVERNIGHT EVENTS: Febrile to 102.8 overnight. Received tylenol. Patient had episode of coughing fits that improved after tesselon perles, hycodan, and dextromethorphan/guaifensin.    SUBJECTIVE: Patient seen and examined at bedside. Patient was sleeping comfortably on room air when entered room. Patient endorses a good night sleep after the cough medications, but that the cough that she has on and off had come back overnight. Patient did not have any more episodes of vomiting or diarrhea overnight. Will try to have breakfast, but worried that she will lose it by vomiting. Still c/o of pleuritic chest pain with deep inspiration and subjective SOB with coughing fits. Fever overnight and associated sweats.     OBJECTIVE:    VITAL SIGNS:  ICU Vital Signs Last 24 Hrs  T(C): 37.8 (22 May 2020 05:05), Max: 39.3 (22 May 2020 02:35)  T(F): 100 (22 May 2020 05:05), Max: 102.8 (22 May 2020 02:35)  HR: 95 (22 May 2020 05:05) (60 - 110)  BP: 109/58 (22 May 2020 05:05) (96/52 - 120/74)  BP(mean): 66 (21 May 2020 14:22) (66 - 66)  ABP: --  ABP(mean): --  RR: 20 (22 May 2020 05:05) (16 - 20)  SpO2: 91% (22 May 2020 05:05) (91% - 98%)        CAPILLARY BLOOD GLUCOSE          PHYSICAL EXAM:  General: No acute distress, sleeping comfortably, but uncomfortable appearing, answering questions   HEENT: PERRLA, EOMI, MMM  Neck: No LAD, No JVD  Respiratory: Slight crackles right lower lobe, no W/R/R. No accessory muscle use. Tolerating room air. Occasional coughing fits, non-productive  Cardiovascular: S1/S2, tachycardic rate and regular rhythm, no M/R/G  Gastrointestinal: BS+, soft, NT/ND  Extremities: No peripheral edema, WWP  Vascular: 2+ peripheral pulses   Neurological: AAO x 3, CN V-XII grossly intact.        MEDICATIONS:  MEDICATIONS  (STANDING):  azithromycin  IVPB 250 milliGRAM(s) IV Intermittent every 24 hours  benzonatate 100 milliGRAM(s) Oral three times a day  cefTRIAXone Injectable. 1000 milliGRAM(s) IV Push every 24 hours  enoxaparin Injectable 40 milliGRAM(s) SubCutaneous every 24 hours  melatonin 3 milliGRAM(s) Oral at bedtime    MEDICATIONS  (PRN):  acetaminophen   Tablet .. 650 milliGRAM(s) Oral every 6 hours PRN Temp greater or equal to 38C (100.4F)  clonazePAM  Tablet 1 milliGRAM(s) Oral daily PRN anxiety  guaifenesin/dextromethorphan  Syrup 10 milliLiter(s) Oral every 6 hours PRN Cough  ondansetron   Disintegrating Tablet 4 milliGRAM(s) Oral every 8 hours PRN Nausea and/or Vomiting      ALLERGIES:  Allergies    No Known Allergies    Intolerances        LABS:                        11.1   19.95 )-----------( 280      ( 22 May 2020 06:47 )             33.6     05-22    142  |  105  |  5<L>  ----------------------------<  88  3.6   |  20<L>  |  0.69    Ca    8.2<L>      22 May 2020 06:47  Phos  2.2     05-22  Mg     1.8     05-22    TPro  5.9<L>  /  Alb  2.8<L>  /  TBili  0.4  /  DBili  x   /  AST  21  /  ALT  <5<L>  /  AlkPhos  56  05-22      Urinalysis Basic - ( 21 May 2020 13:46 )    Color: Yellow / Appearance: Clear / SG: >=1.030 / pH: x  Gluc: x / Ketone: >=80 mg/dL  / Bili: Negative / Urobili: 0.2 E.U./dL   Blood: x / Protein: Trace mg/dL / Nitrite: NEGATIVE   Leuk Esterase: NEGATIVE / RBC: < 5 /HPF / WBC < 5 /HPF   Sq Epi: x / Non Sq Epi: Many /HPF / Bacteria: Present /HPF        Procalcitonin: Procalcitonin, Serum: 0.29 ng/mL (05-21-20 @ 13:15)    D-dimer: D-Dimer Assay, Quantitative: 552 ng/mL DDU (05-22-20 @ 06:47)  D-Dimer Assay, Quantitative: 479 ng/mL DDU (05-21-20 @ 13:15)    ESR: Sedimentation Rate, Erythrocyte: 68 mm/Hr (05-21-20 @ 13:15)    CRP: C-Reactive Protein, Serum: 22.05 mg/dL (05-22-20 @ 06:47)  C-Reactive Protein, Serum: 27.72 mg/dL (05-21-20 @ 13:15)    LDH:   Ferritin: Ferritin, Serum: 342 ng/mL (05-22-20 @ 06:47)  Ferritin, Serum: 360 ng/mL (05-21-20 @ 13:15)    Lactate: Lactate, Blood: 0.7 mmol/L (05-21-20 @ 14:24)  lc  Trop I: Troponin T, Serum: <0.01 ng/mL (05-21-20 @ 13:15)    Ck: Creatine Kinase, Serum: 51 U/L (05-21-20 @ 13:15)        COVID Labs  Full T cell subset:   G6PD:   Immunoglobulins panel:   Quantiferon Gold Tb:   Triglyceride level:         Culture - Blood (collected 05-21-20 @ 15:02)  Source: .Blood Blood  Preliminary Report (05-22-20 @ 04:00):    No growth at 12 hours    Culture - Blood (collected 05-21-20 @ 15:02)  Source: .Blood Blood  Preliminary Report (05-22-20 @ 04:00):    No growth at 12 hours            RADIOLOGY & ADDITIONAL TESTS: Reviewed. ** Addendum for Hospital Course **    Hospital Course and Transfer Note (COVID RMF to COVID ICU for procedure)  21 yo F with PMHx of asthma, anxiety and depression presents to the ED with fever, cough, chills, running nose, nausea, diarrhea, abd pain for four days. Of note, patient was seen at [facility] 5/20 and was worked up, COVID (-), dx with bilateral PNA, d/c'ed on augmentin/doxycyclin and zofran for nausea. However, patient has had difficulty tolerating PO and has only been able to tolerate some fluid. In [facility] ED, CXR and elevated WBC to 18 concerning for bacterial infection superimposed on possible viral pneumonia. Patient started ceftriaxone and azithromycin.  Patient received 2L of IVF in ED and LR at 90cc/hr overnight as well as anti-nausea medication. Overnight patient had several coughing episodes that required tesselon perles, guaifenisin, and x1 hycodan. In AM, patient was 91 on room air and vitals notable for tachycardia. CT PE no pulmonary embolism, but potential worsening of now extensive groundglass opacity. COVID antibodies negative and swab prelim negative. Patient continued to have waxing and waning fever to 103 during day, placed on standing tylenol, antibiotics broadened to vancomycin (pending MRSA swab result) and zosyn. Pulm consulted for potential COVID intervention. Patient originally denied smoking history, but later endorsed marijuana vape pod use several times per week and last use 3 days ago. CT results possibly 2/2 vape-related lung injury. NPO at midnight and transfer to MICU for possible bronchoscopy in AM.            INTERVAL HPI/OVERNIGHT EVENTS: Febrile to 102.8 overnight. Received tylenol. Patient had episode of coughing fits that improved after tesselon perles, hycodan, and dextromethorphan/guaifensin.    SUBJECTIVE: Patient seen and examined at bedside. Patient was sleeping comfortably on room air when entered room. Patient endorses a good night sleep after the cough medications, but that the cough that she has on and off had come back overnight. Patient did not have any more episodes of vomiting or diarrhea overnight. Will try to have breakfast, but worried that she will lose it by vomiting. Still c/o of pleuritic chest pain with deep inspiration and subjective SOB with coughing fits. Fever overnight and associated sweats.     OBJECTIVE:    VITAL SIGNS:  ICU Vital Signs Last 24 Hrs  T(C): 37.8 (22 May 2020 05:05), Max: 39.3 (22 May 2020 02:35)  T(F): 100 (22 May 2020 05:05), Max: 102.8 (22 May 2020 02:35)  HR: 95 (22 May 2020 05:05) (60 - 110)  BP: 109/58 (22 May 2020 05:05) (96/52 - 120/74)  BP(mean): 66 (21 May 2020 14:22) (66 - 66)  ABP: --  ABP(mean): --  RR: 20 (22 May 2020 05:05) (16 - 20)  SpO2: 91% (22 May 2020 05:05) (91% - 98%)        CAPILLARY BLOOD GLUCOSE          PHYSICAL EXAM:  General: No acute distress, sleeping comfortably, but uncomfortable appearing, answering questions   HEENT: PERRLA, EOMI, MMM  Neck: No LAD, No JVD  Respiratory: Slight crackles right lower lobe, no W/R/R. No accessory muscle use. Tolerating room air. Occasional coughing fits, non-productive  Cardiovascular: S1/S2, tachycardic rate and regular rhythm, no M/R/G  Gastrointestinal: BS+, soft, NT/ND  Extremities: No peripheral edema, WWP  Vascular: 2+ peripheral pulses   Neurological: AAO x 3, CN V-XII grossly intact.        MEDICATIONS:  MEDICATIONS  (STANDING):  azithromycin  IVPB 250 milliGRAM(s) IV Intermittent every 24 hours  benzonatate 100 milliGRAM(s) Oral three times a day  cefTRIAXone Injectable. 1000 milliGRAM(s) IV Push every 24 hours  enoxaparin Injectable 40 milliGRAM(s) SubCutaneous every 24 hours  melatonin 3 milliGRAM(s) Oral at bedtime    MEDICATIONS  (PRN):  acetaminophen   Tablet .. 650 milliGRAM(s) Oral every 6 hours PRN Temp greater or equal to 38C (100.4F)  clonazePAM  Tablet 1 milliGRAM(s) Oral daily PRN anxiety  guaifenesin/dextromethorphan  Syrup 10 milliLiter(s) Oral every 6 hours PRN Cough  ondansetron   Disintegrating Tablet 4 milliGRAM(s) Oral every 8 hours PRN Nausea and/or Vomiting      ALLERGIES:  Allergies    No Known Allergies    Intolerances        LABS:                        11.1   19.95 )-----------( 280      ( 22 May 2020 06:47 )             33.6     05-22    142  |  105  |  5<L>  ----------------------------<  88  3.6   |  20<L>  |  0.69    Ca    8.2<L>      22 May 2020 06:47  Phos  2.2     05-22  Mg     1.8     05-22    TPro  5.9<L>  /  Alb  2.8<L>  /  TBili  0.4  /  DBili  x   /  AST  21  /  ALT  <5<L>  /  AlkPhos  56  05-22      Urinalysis Basic - ( 21 May 2020 13:46 )    Color: Yellow / Appearance: Clear / SG: >=1.030 / pH: x  Gluc: x / Ketone: >=80 mg/dL  / Bili: Negative / Urobili: 0.2 E.U./dL   Blood: x / Protein: Trace mg/dL / Nitrite: NEGATIVE   Leuk Esterase: NEGATIVE / RBC: < 5 /HPF / WBC < 5 /HPF   Sq Epi: x / Non Sq Epi: Many /HPF / Bacteria: Present /HPF        Procalcitonin: Procalcitonin, Serum: 0.29 ng/mL (05-21-20 @ 13:15)    D-dimer: D-Dimer Assay, Quantitative: 552 ng/mL DDU (05-22-20 @ 06:47)  D-Dimer Assay, Quantitative: 479 ng/mL DDU (05-21-20 @ 13:15)    ESR: Sedimentation Rate, Erythrocyte: 68 mm/Hr (05-21-20 @ 13:15)    CRP: C-Reactive Protein, Serum: 22.05 mg/dL (05-22-20 @ 06:47)  C-Reactive Protein, Serum: 27.72 mg/dL (05-21-20 @ 13:15)    LDH:   Ferritin: Ferritin, Serum: 342 ng/mL (05-22-20 @ 06:47)  Ferritin, Serum: 360 ng/mL (05-21-20 @ 13:15)    Lactate: Lactate, Blood: 0.7 mmol/L (05-21-20 @ 14:24)  lc  Trop I: Troponin T, Serum: <0.01 ng/mL (05-21-20 @ 13:15)    Ck: Creatine Kinase, Serum: 51 U/L (05-21-20 @ 13:15)        COVID Labs  Full T cell subset:   G6PD:   Immunoglobulins panel:   Quantiferon Gold Tb:   Triglyceride level:         Culture - Blood (collected 05-21-20 @ 15:02)  Source: .Blood Blood  Preliminary Report (05-22-20 @ 04:00):    No growth at 12 hours    Culture - Blood (collected 05-21-20 @ 15:02)  Source: .Blood Blood  Preliminary Report (05-22-20 @ 04:00):    No growth at 12 hours            RADIOLOGY & ADDITIONAL TESTS: Reviewed.

## 2020-05-22 NOTE — PROGRESS NOTE ADULT - PROBLEM SELECTOR PLAN 2
- Patient presenting with 1 week of respiratory and gastrointestinal symptoms along with tactile fevers/chills.   - Patient was seen in [place] ED prior to admission and d/jesi with antibiotics. COVID-19 swab negative yesterday and also negative (prelim) today in ED.   - Based on presenting symptoms and labwork (with lymphopenia and high CRP 27), can not fully exclude an acute coronavirus infection.  - F/u final Covid-19 result   - CXR with focal left lower lobe infiltrate and CP blunting is more favorable of a bacterial process   - At this time, will treat patient as potential viral covid-19 infection with superimposed bacterial pneumonia.   - Patient is on room air with sat high 90s   - No treatment being pursued at this time given no O2 requirement.   - No toci at this time in light of bacterial process - Patient presenting with 1 week of respiratory and gastrointestinal symptoms along with tactile fevers/chills.   - Patient was seen in [place] ED prior to admission and d/jesi with antibiotics. COVID-19 swab negative yesterday and also negative (prelim) today in ED.   - Based on presenting symptoms and labwork (with lymphopenia and high CRP 27), can not fully exclude an acute coronavirus infection.  - F/u final Covid-19 result   - CXR with focal left lower lobe infiltrate and CP blunting is more favorable of a bacterial process   - At this time, will treat patient as potential viral covid-19 infection with superimposed bacterial pneumonia.   - No treatment being pursued at this time given no O2 requirement.   - No toci at this time in light of bacterial process  - Episode of desaturation to high 80s/low 90s overnight when coughing, improved to 94% at rest on RA. Placed on 2L NC and will wean as tolerated  - COVID PCR validated negative. Antibodies negative. Cannot rule out COVID-19

## 2020-05-22 NOTE — PROGRESS NOTE ADULT - PROBLEM SELECTOR PLAN 10
F: None  E: Replete PRN for Mg <2 and K<4  N: CLD, advance as tolerated    Prophylaxis: Lovenox 40mg sq daily  Dispo: COVID-RMF, pending r/o

## 2020-05-22 NOTE — PROGRESS NOTE ADULT - PROBLEM SELECTOR PLAN 9
Hx of substance use with Marijuana. ISTOP - notable for hycodan and 2 orders of codeine filled recently in March.  - f/u urine toxicology F: None  E: Replete PRN for Mg <2 and K<4  N: CLD, advance as tolerated    Prophylaxis: Lovenox 40mg sq daily  Dispo: COVID-RMF, pending r/o

## 2020-05-23 LAB
ALBUMIN SERPL ELPH-MCNC: 3.4 G/DL — SIGNIFICANT CHANGE UP (ref 3.3–5)
ALP SERPL-CCNC: 74 U/L — SIGNIFICANT CHANGE UP (ref 40–120)
ALT FLD-CCNC: 6 U/L — LOW (ref 10–45)
ANION GAP SERPL CALC-SCNC: 15 MMOL/L — SIGNIFICANT CHANGE UP (ref 5–17)
AST SERPL-CCNC: 25 U/L — SIGNIFICANT CHANGE UP (ref 10–40)
BASE EXCESS BLDA CALC-SCNC: 3.2 MMOL/L — HIGH (ref -2–3)
BASOPHILS # BLD AUTO: 0.06 K/UL — SIGNIFICANT CHANGE UP (ref 0–0.2)
BASOPHILS NFR BLD AUTO: 0.2 % — SIGNIFICANT CHANGE UP (ref 0–2)
BILIRUB SERPL-MCNC: 0.4 MG/DL — SIGNIFICANT CHANGE UP (ref 0.2–1.2)
BUN SERPL-MCNC: 2 MG/DL — LOW (ref 7–23)
CALCIUM SERPL-MCNC: 8.9 MG/DL — SIGNIFICANT CHANGE UP (ref 8.4–10.5)
CHLORIDE SERPL-SCNC: 103 MMOL/L — SIGNIFICANT CHANGE UP (ref 96–108)
CO2 SERPL-SCNC: 25 MMOL/L — SIGNIFICANT CHANGE UP (ref 22–31)
CREAT SERPL-MCNC: 0.59 MG/DL — SIGNIFICANT CHANGE UP (ref 0.5–1.3)
CRP SERPL-MCNC: 33.14 MG/DL — HIGH (ref 0–0.4)
D DIMER BLD IA.RAPID-MCNC: 758 NG/ML DDU — HIGH
EOSINOPHIL # BLD AUTO: 0.09 K/UL — SIGNIFICANT CHANGE UP (ref 0–0.5)
EOSINOPHIL NFR BLD AUTO: 0.3 % — SIGNIFICANT CHANGE UP (ref 0–6)
FERRITIN SERPL-MCNC: 419 NG/ML — HIGH (ref 15–150)
GLUCOSE BLDC GLUCOMTR-MCNC: 103 MG/DL — HIGH (ref 70–99)
GLUCOSE SERPL-MCNC: 98 MG/DL — SIGNIFICANT CHANGE UP (ref 70–99)
HCO3 BLDA-SCNC: 28 MMOL/L — SIGNIFICANT CHANGE UP (ref 21–28)
HCT VFR BLD CALC: 38.2 % — SIGNIFICANT CHANGE UP (ref 34.5–45)
HGB BLD-MCNC: 12.8 G/DL — SIGNIFICANT CHANGE UP (ref 11.5–15.5)
HIV 1+2 AB+HIV1 P24 AG SERPL QL IA: SIGNIFICANT CHANGE UP
IMM GRANULOCYTES NFR BLD AUTO: 0.6 % — SIGNIFICANT CHANGE UP (ref 0–1.5)
LEGIONELLA AG UR QL: NEGATIVE — SIGNIFICANT CHANGE UP
LYMPHOCYTES # BLD AUTO: 1.41 K/UL — SIGNIFICANT CHANGE UP (ref 1–3.3)
LYMPHOCYTES # BLD AUTO: 5 % — LOW (ref 13–44)
MAGNESIUM SERPL-MCNC: 2.3 MG/DL — SIGNIFICANT CHANGE UP (ref 1.6–2.6)
MCHC RBC-ENTMCNC: 30.9 PG — SIGNIFICANT CHANGE UP (ref 27–34)
MCHC RBC-ENTMCNC: 33.5 GM/DL — SIGNIFICANT CHANGE UP (ref 32–36)
MCV RBC AUTO: 92.3 FL — SIGNIFICANT CHANGE UP (ref 80–100)
MONOCYTES # BLD AUTO: 0.29 K/UL — SIGNIFICANT CHANGE UP (ref 0–0.9)
MONOCYTES NFR BLD AUTO: 1 % — LOW (ref 2–14)
NEUTROPHILS # BLD AUTO: 25.95 K/UL — HIGH (ref 1.8–7.4)
NEUTROPHILS NFR BLD AUTO: 92.9 % — HIGH (ref 43–77)
NRBC # BLD: 0 /100 WBCS — SIGNIFICANT CHANGE UP (ref 0–0)
PCO2 BLDA: 42 MMHG — SIGNIFICANT CHANGE UP (ref 32–45)
PH BLDA: 7.43 — SIGNIFICANT CHANGE UP (ref 7.35–7.45)
PHOSPHATE SERPL-MCNC: 1.8 MG/DL — LOW (ref 2.5–4.5)
PLATELET # BLD AUTO: 342 K/UL — SIGNIFICANT CHANGE UP (ref 150–400)
PO2 BLDA: 61 MMHG — LOW (ref 83–108)
POTASSIUM SERPL-MCNC: 3.4 MMOL/L — LOW (ref 3.5–5.3)
POTASSIUM SERPL-SCNC: 3.4 MMOL/L — LOW (ref 3.5–5.3)
PROCALCITONIN SERPL-MCNC: 1.2 NG/ML — HIGH (ref 0.02–0.1)
PROT SERPL-MCNC: 6.9 G/DL — SIGNIFICANT CHANGE UP (ref 6–8.3)
RBC # BLD: 4.14 M/UL — SIGNIFICANT CHANGE UP (ref 3.8–5.2)
RBC # FLD: 12.6 % — SIGNIFICANT CHANGE UP (ref 10.3–14.5)
SAO2 % BLDA: 93 % — LOW (ref 95–100)
SARS-COV-2 RNA SPEC QL NAA+PROBE: SIGNIFICANT CHANGE UP
SODIUM SERPL-SCNC: 143 MMOL/L — SIGNIFICANT CHANGE UP (ref 135–145)
WBC # BLD: 27.98 K/UL — HIGH (ref 3.8–10.5)
WBC # FLD AUTO: 27.98 K/UL — HIGH (ref 3.8–10.5)

## 2020-05-23 PROCEDURE — 71045 X-RAY EXAM CHEST 1 VIEW: CPT | Mod: 26

## 2020-05-23 PROCEDURE — 99223 1ST HOSP IP/OBS HIGH 75: CPT

## 2020-05-23 PROCEDURE — 99233 SBSQ HOSP IP/OBS HIGH 50: CPT

## 2020-05-23 RX ORDER — ACETAMINOPHEN 500 MG
650 TABLET ORAL ONCE
Refills: 0 | Status: COMPLETED | OUTPATIENT
Start: 2020-05-23 | End: 2020-05-23

## 2020-05-23 RX ORDER — ONDANSETRON 8 MG/1
4 TABLET, FILM COATED ORAL ONCE
Refills: 0 | Status: DISCONTINUED | OUTPATIENT
Start: 2020-05-23 | End: 2020-05-27

## 2020-05-23 RX ORDER — ONDANSETRON 8 MG/1
4 TABLET, FILM COATED ORAL ONCE
Refills: 0 | Status: COMPLETED | OUTPATIENT
Start: 2020-05-23 | End: 2020-05-23

## 2020-05-23 RX ORDER — POTASSIUM PHOSPHATE, MONOBASIC POTASSIUM PHOSPHATE, DIBASIC 236; 224 MG/ML; MG/ML
30 INJECTION, SOLUTION INTRAVENOUS ONCE
Refills: 0 | Status: COMPLETED | OUTPATIENT
Start: 2020-05-23 | End: 2020-05-23

## 2020-05-23 RX ORDER — ENOXAPARIN SODIUM 100 MG/ML
40 INJECTION SUBCUTANEOUS EVERY 24 HOURS
Refills: 0 | Status: DISCONTINUED | OUTPATIENT
Start: 2020-05-23 | End: 2020-05-24

## 2020-05-23 RX ADMIN — Medication 100 MILLIGRAM(S): at 00:08

## 2020-05-23 RX ADMIN — Medication 3 MILLIGRAM(S): at 21:18

## 2020-05-23 RX ADMIN — Medication 650 MILLIGRAM(S): at 17:52

## 2020-05-23 RX ADMIN — Medication 650 MILLIGRAM(S): at 22:06

## 2020-05-23 RX ADMIN — POTASSIUM PHOSPHATE, MONOBASIC POTASSIUM PHOSPHATE, DIBASIC 83.33 MILLIMOLE(S): 236; 224 INJECTION, SOLUTION INTRAVENOUS at 10:03

## 2020-05-23 RX ADMIN — Medication 100 MILLIGRAM(S): at 17:51

## 2020-05-23 RX ADMIN — ONDANSETRON 4 MILLIGRAM(S): 8 TABLET, FILM COATED ORAL at 18:42

## 2020-05-23 RX ADMIN — Medication 650 MILLIGRAM(S): at 12:31

## 2020-05-23 RX ADMIN — ONDANSETRON 4 MILLIGRAM(S): 8 TABLET, FILM COATED ORAL at 07:13

## 2020-05-23 RX ADMIN — CEFTRIAXONE 100 MILLIGRAM(S): 500 INJECTION, POWDER, FOR SOLUTION INTRAMUSCULAR; INTRAVENOUS at 21:17

## 2020-05-23 RX ADMIN — Medication 100 MILLIGRAM(S): at 21:18

## 2020-05-23 RX ADMIN — Medication 1 MILLIGRAM(S): at 21:18

## 2020-05-23 RX ADMIN — ENOXAPARIN SODIUM 40 MILLIGRAM(S): 100 INJECTION SUBCUTANEOUS at 21:17

## 2020-05-23 RX ADMIN — AZITHROMYCIN 255 MILLIGRAM(S): 500 TABLET, FILM COATED ORAL at 22:07

## 2020-05-23 RX ADMIN — Medication 650 MILLIGRAM(S): at 07:39

## 2020-05-23 RX ADMIN — Medication 100 MILLIGRAM(S): at 13:56

## 2020-05-23 NOTE — PROGRESS NOTE ADULT - SUBJECTIVE AND OBJECTIVE BOX
Transfer note from COVID ICU to non COVID icu/  Hospital course:  21 yo F with PMHx of asthma, anxiety and depression presents to the ED with fever, cough, chills, running nose, nausea, diarrhea, abd pain for four days. Of note, patient was seen at [facility] 5/20 and was worked up, COVID (-), dx with bilateral PNA, d/c'ed on augmentin/doxycyclin and zofran for nausea. However, patient has had difficulty tolerating PO and has only been able to tolerate some fluid. In [facility] ED, CXR and elevated WBC to 18 concerning for bacterial infection superimposed on possible viral pneumonia. Patient started ceftriaxone and azithromycin.  Patient received 2L of IVF in ED and LR at 90cc/hr overnight as well as anti-nausea medication. Overnight patient had several coughing episodes that required tesselon perles, guaifenisin, and x1 hycodan. In AM, patient was 91 on room air and vitals notable for tachycardia. CT PE no pulmonary embolism, but potential worsening of now extensive groundglass opacity. COVID antibodies negative and swab prelim negative. Patient continued to have waxing and waning fever to 103 during day, placed on standing tylenol, antibiotics broadened to vancomycin (pending MRSA swab result) and zosyn. Pulm consulted for potential COVID intervention. Patient originally denied smoking history, but later endorsed marijuana vape pod use several times per week and last use 3 days ago. CT results possibly 2/2 vape-related lung injury. On 5/22 transferred to COVID ICU for probable broch. CTA neg for PE, worsening b/l groundglass opacities and ?pul edema. Now COVID PCR x 2 neg, step down to non COVID ICU.     INTERVAL HPI/OVERNIGHT EVENTS: 2 vomiting episodes. Pt concerned about her employer finding out about her hospitalization. She only wants us to discuss with Benedict regarding her medical problems.     SUBJECTIVE: Patient seen and examined at bedside. Somnolent. Still with nausea. No other complaints.     OBJECTIVE:    VITAL SIGNS:  ICU Vital Signs Last 24 Hrs  T(C): 39.3 (23 May 2020 09:51), Max: 40.3 (23 May 2020 06:00)  T(F): 102.7 (23 May 2020 09:51), Max: 104.5 (23 May 2020 06:00)  HR: 105 (23 May 2020 09:00) (63 - 131)  BP: 122/66 (23 May 2020 09:00) (95/54 - 136/72)  BP(mean): 88 (23 May 2020 09:00) (72 - 98)  ABP: --  ABP(mean): --  RR: 38 (23 May 2020 09:00) (9 - 45)  SpO2: 96% (23 May 2020 09:00) (91% - 100%)        05-22 @ 07:01  -  05-23 @ 07:00  --------------------------------------------------------  IN: 0 mL / OUT: 1600 mL / NET: -1600 mL    05-23 @ 07:01  -  05-23 @ 10:09  --------------------------------------------------------  IN: 0 mL / OUT: 1 mL / NET: -1 mL      CAPILLARY BLOOD GLUCOSE          PHYSICAL EXAM:    General: NAD, NC in place  HEENT: NC/AT; PERRL, clear conjunctiva  Neck: supple  Respiratory: crackles troughout  Cardiovascular: +S1/S2; RRR  Abdomen: soft, NT/ND; +BS x4  Extremities: WWP, 2+ peripheral pulses b/l; no LE edema  Skin: normal color and turgor; no rash  Neurological: AAOx3, no focal deficitis    MEDICATIONS:  MEDICATIONS  (STANDING):  acetaminophen   Tablet .. 650 milliGRAM(s) Oral every 6 hours  ALBUTerol    90 MICROgram(s) HFA Inhaler 1 Puff(s) Inhalation every 6 hours  azithromycin  IVPB 500 milliGRAM(s) IV Intermittent every 24 hours  benzonatate 100 milliGRAM(s) Oral three times a day  cefTRIAXone   IVPB 2000 milliGRAM(s) IV Intermittent every 24 hours  enoxaparin Injectable 40 milliGRAM(s) SubCutaneous every 24 hours  melatonin 3 milliGRAM(s) Oral at bedtime  ondansetron Injectable 4 milliGRAM(s) IV Push once    MEDICATIONS  (PRN):  clonazePAM  Tablet 1 milliGRAM(s) Oral daily PRN anxiety  hydrocodone/homatropine Syrup 5 milliLiter(s) Oral every 6 hours PRN Cough  LORazepam     Tablet 0.5 milliGRAM(s) Oral daily PRN Anxiety  promethazine 25 milliGRAM(s) Oral every 6 hours PRN nausea and vomiting      ALLERGIES:  Allergies    No Known Allergies    Intolerances        LABS:                        12.8   27.98 )-----------( 342      ( 23 May 2020 06:26 )             38.2     05-23    143  |  103  |  2<L>  ----------------------------<  98  3.4<L>   |  25  |  0.59    Ca    8.9      23 May 2020 06:26  Phos  1.8     05-23  Mg     2.3     05-23    TPro  6.9  /  Alb  3.4  /  TBili  0.4  /  DBili  x   /  AST  25  /  ALT  6<L>  /  AlkPhos  74  05-23      Urinalysis Basic - ( 21 May 2020 13:46 )    Color: Yellow / Appearance: Clear / SG: >=1.030 / pH: x  Gluc: x / Ketone: >=80 mg/dL  / Bili: Negative / Urobili: 0.2 E.U./dL   Blood: x / Protein: Trace mg/dL / Nitrite: NEGATIVE   Leuk Esterase: NEGATIVE / RBC: < 5 /HPF / WBC < 5 /HPF   Sq Epi: x / Non Sq Epi: Many /HPF / Bacteria: Present /HPF        RADIOLOGY & ADDITIONAL TESTS: Reviewed.

## 2020-05-23 NOTE — PROGRESS NOTE ADULT - ASSESSMENT
Pt is a 20F PMH anxiety and depression presenting on 5/21 with fever, cough, chills, running nose, nausea, diarrhea, abd pain x3 days. Possibly 2/2 COVID-19 and/or bacterial process. Initially admitted to [facility], transferred to  for possible  bronchoscopy. Now COVID PCR swab negative x2, transferred to non COVID ICU on 5/23.     Neuro  - No active issues    Respiratory  #COVID pneumonia vs vaping-associated lung injury (BELGICA)  Pt presents with fever/chills, cough; WBC 18, lymphopenic with ALC 0.87, CT chest with bilateral ground glass opacities. marijuana vape pod use, most recently 3 days prior to admission  -CTA 5/22 neg for PE, with worsening b/l ground glass opacities, cannot r/o edema/fluid overload (s/p 2L NS in the ED and fluid gtt for 12hours)  -f/u HIV test and Autoimmune panel (TIFFANY, RF, CCP, Sjogren's,)  -obtain echo given ?pulmonary edema   -COVID swab PCR neg x2 and antibody negative  -Currently saturating 93-96% on 6L NC, although desats to high 80s/low 90s with coughing on room air  - Hycodan tessalon perle for cough suppression  - consider bronchoscopy     #Bacterial pneumonia  Pt febrile to 103.3F, tachycardic to 131, WBC 20 (3/4 SIRS criteria). CXR with LLL consolidative process  - MRSA swab negative  - blood cx NGTD,   - f/u urine legionella , strep pneumo  - c/w CTX/Azithro started on 5/22  - consult ID for broadening antibiotics     ID  - Management as above    Cardiovascular  - No active issues    GI  #Diarrhea  - Pt reports four days of loose stools, non-bloody with no mucus  - GI PCR negative  - f/u stool culture  - Continue to monitor    #Nausea  - Promethazine for nausea/vomiting    #Nutrition  - Pt unable to tolerate much PO  - Clear liquid diet  - NPO at midnight for probable bronchoscopy on 5/22    Renal  - BUN/Cr 2/0.59    Heme  #Anemia  - Hgb 12.8  - Continue to monitor  - Maintain active type and screen    Psych  #Anxiety  Pt on klonopin 1mg daily PRN and is noncompliant with lexapro 20mg daily  - Continue klonopin 1mg daily PRN    Endocrine  - No active issues    F: None  E: Replete PRN for K<4, Mg<2  N: Clear liquid, NPO at midnight for bronch  DVT PPx: LSQ DVT ppx   GI PPx: None  Dispo: 7E to 5E

## 2020-05-23 NOTE — CONSULT NOTE ADULT - ASSESSMENT
IMPRESSION:  The clinical history does suggest COVID-19 given fevers, GI symptoms which then progressed to respiratory symptoms.  CT chest also suggestive of COVID. However COVID PCR is negative x 2 and COVID antibody is negative.  The PCRs could be a false negative and she could be in the window period for the antibody however at this time other diagnoses should be explored.  Suspect a pulmonary source of her fever (pneumonia).  Pneumonia is a frequent cause of diarrhea.  She has no history of immune suppression or recent health care exposures or travel.  Can treat as a community acquired pneumonia    Recommend:  1.  Agree with Ceftriaxone 2 grams IV daily + Azithromycin 500 mg IV daily  2.  Check strep pneumonia urine antigen  3.  If bronch done please send sputum for legionella PCR  4.  If bronch done please send sputum for bacterial, fungal, AFB culture  5.  Check serum fungitel  6.  Given recent outdoor exposure, send tick-borne disease antibodies, EHRLICHIA/ANAPLASMA PCR on serum.  These would not  explain GI symptoms and don't cause pneumonia but can cause fever  7.  Can start empiric doxycycline 100 mg PO q12 which will also cover MSSA well    ID team 1 will follow
19 yo F with hx of anxiety and depression, consult for acute hypoxemic respiratory failure and abnormal findings in CT scan.

## 2020-05-23 NOTE — CONSULT NOTE ADULT - SUBJECTIVE AND OBJECTIVE BOX
HPI:  21 yo F with PMHx of anxiety and depression presents to the ED with fever, cough, chills, running nose, nausea, diarrhea, abd pain for four days. Patient has been self isolating and home and denies sick contacts. Of note, patient was seen at [facility] 5/20 and was worked up, COVID (-), dx with bilateral PNA, d/c'ed on augmentin/doxycyclin and zofran for nausea. However, patient has had difficulty tolerating PO and has only been able to tolerate some fluid today. She was only able to take one dose of the antibiotics at home. c/o diarrhea described as complete liquid, denies blood or mucous. Review of systems is positive for nausea, SOB with some inspiratory tenderness sternal area with deep breath, abd pain, and diarrhea.    ED Vitals: T: Afebrile | HR: 74-110bpm | BP: /52-62mmHg | RR: 16-18/min | SpO2: 98% RA  ED Course: CXR: Patchy consolidation left lower lobe likely pneumonia. UA: negative. Procal 0.29. WBC: 18.3 (92.6% neutrophils). CRP 27.7. Ferritin 360. COVID Negative (prelim). Received 2L NS, zofran x1, 0.5mg ativan PO, toradol x1, pepcid x1, protonix x1, 25mg Phenergan, ceftriaxone 1g and azithromycin. (21 May 2020 16:20)    Patient works as a live in [employer] to a family of 5 (includes 2 children, ages 6 and 11).  The family lives in [place].  She reports that other than an occasional trip to grocery store she has been self-isolating at home.  She did go on one hike in the mountains in [place]. Denies any tick exposures.  No one she lives with is sick       PAST MEDICAL & SURGICAL HISTORY:  Anxiety and depression  History of tonsillectomy        REVIEW OF SYSTEMS:    General:	 no weakness; + fevers, + chills  Skin/Breast: no rash  Respiratory and Thorax: + SOB, + cough  Cardiovascular:	No chest pain  Gastrointestinal:	 no nausea, vomiting , + diarrhea  Genitourinary:	no dysuria, no difficulty urinating, no hematuria  Musculoskeletal:	no weakness, no joint swelling/pain  Neurological:	no focal weakness/numbness  Endocrine:	no polyuria, no polydipsia      ANTIBIOTICS:  MEDICATIONS  (STANDING):  acetaminophen   Tablet .. 650 milliGRAM(s) Oral every 6 hours  ALBUTerol    90 MICROgram(s) HFA Inhaler 1 Puff(s) Inhalation every 6 hours  azithromycin  IVPB 500 milliGRAM(s) IV Intermittent every 24 hours  benzonatate 100 milliGRAM(s) Oral three times a day  cefTRIAXone   IVPB 2000 milliGRAM(s) IV Intermittent every 24 hours  doxycycline hyclate Capsule 100 milliGRAM(s) Oral every 12 hours  enoxaparin Injectable 40 milliGRAM(s) SubCutaneous every 24 hours  melatonin 3 milliGRAM(s) Oral at bedtime  ondansetron Injectable 4 milliGRAM(s) IV Push once    MEDICATIONS  (PRN):  clonazePAM  Tablet 1 milliGRAM(s) Oral daily PRN anxiety  hydrocodone/homatropine Syrup 5 milliLiter(s) Oral every 6 hours PRN Cough  LORazepam     Tablet 0.5 milliGRAM(s) Oral daily PRN Anxiety  promethazine 25 milliGRAM(s) Oral every 6 hours PRN nausea and vomiting      Allergies    No Known Allergies    Intolerances        SOCIAL HISTORY:    FAMILY HISTORY:      Vital Signs Last 24 Hrs  T(C): 37.8 (23 May 2020 14:00), Max: 40.3 (23 May 2020 06:00)  T(F): 100.1 (23 May 2020 14:00), Max: 104.5 (23 May 2020 06:00)  HR: 83 (23 May 2020 14:00) (63 - 121)  BP: 112/59 (23 May 2020 14:00) (95/54 - 136/72)  BP(mean): 81 (23 May 2020 14:00) (72 - 98)  RR: 46 (23 May 2020 14:00) (9 - 50)  SpO2: 95% (23 May 2020 14:00) (91% - 100%)    PHYSICAL EXAM:  Constitutional:  non-toxic  Eyes: no icterus   Ear/Nose/Throat: no oral lesion, no sinus tenderness on percussion	  Neck:  supple  Respiratory: decreased breath sounds bilateral bases  Cardiovascular: S1S2 RRR, no murmurs  Gastrointestinal:soft, (+) BS, no HSM  Extremities:no edema  skin:  no rashes   Vascular: DP Pulse:	right normal; left normal            LABS:                        12.8   27.98 )-----------( 342      ( 23 May 2020 06:26 )             38.2     05-23    143  |  103  |  2<L>  ----------------------------<  98  3.4<L>   |  25  |  0.59    Ca    8.9      23 May 2020 06:26  Phos  1.8     05-23  Mg     2.3     05-23    TPro  6.9  /  Alb  3.4  /  TBili  0.4  /  DBili  x   /  AST  25  /  ALT  6<L>  /  AlkPhos  74  05-23        COVID-19 PCR . (05.22.20 @ 17:03)    COVID-19 PCR: NotDetec: Preliminary result. Confirmatory testing to be performed at [facility] by alternative testing method.  This test has been validated by [facility] [employer] [facility] to be accurate;  though it has not been FDA cleared/approved by the usual pathway  As with all laboratory test, results should be correlated with clinical  findings.  Performed at  [facility]  https://www.fda.gov/media/562728/download  https://www.fda.gov/media/239383/download    COVID-19  Antibody - for prior infection screening (05.22.20 @ 01:08)    COVID-19 IgG Antibody Index: 0.1: Abbott CMIA  Negative Result    <= 1.39 Index  Positive Result      >= 1.40 Index Index    COVID-19 IgG Antibody Interpretation: Negative: This test has not been reviewed by the FDA by the standard review  process. It has been authorized for emergency use by the FDA. [facility] has validated this test to be accurate.  Negative results do not rule out SARS-CoV-2 infection, particularly in  those who have been in recent contact with the virus. Follow-up testing  with a molecular diagnostic test should be considered to rule out  infection in these individuals.  Results from antibody testing should not be used as thesole basis to  diagnose or exclude SARS-CoV-2 infection, or to inform infection status.  Positive results may rarely be due to past or present infection with  non-SARS-CoV-2 coronavirus strains, such as coronavirus HKU1, NL63, OC43,  or 229E. [facility], through extensive validation  testing, has confirmed that this risk is minimal with this test.        MICROBIOLOGY:        GI PCR Panel, Stool (05.22.20 @ 15:01)    Culture Results:   GI PCR Results: NOT detected  *******Please Note:*******  GI panel PCR evaluates for:  Campylobacter, Plesiomonas shigelloides, Salmonella,  Vibrio, Yersinia enterocolitica, Enteroaggregative  Escherichia coli (EAEC), Enteropathogenic E.coli (EPEC),  Enterotoxigenic E. coli (ETEC) lt/st, Shiga-like  toxin-producing E. coli (STEC) stx1/stx2,  Shigella/ Enteroinvasive E. coli (EIEC), Cryptosporidium,  Cyclospora cayetanensis, Entamoeba histolytica,  Giardia lamblia, Adenovirus F 40/41, Astrovirus,  Norovirus GI/GII, Rotavirus A, Sapovirus    Culture - Stool (05.22.20 @ 09:54)    Specimen Source: .Stool Feces    Culture Results:   No enteric pathogens to date: Final culture pending    Culture - Blood (05.21.20 @ 15:02)    Specimen Source: .Blood Blood    Culture Results:   No growth at 1 day.      RADIOLOGY & ADDITIONAL STUDIES:      < from: CT Angio Chest PE Protocol w/ IV Cont (05.22.20 @ 14:57) >  1. No evidence of pulmonary embolism.    2. Worsening of now extensive groundglass opacity throughout both lungs compared to chest x-ray of 5/21/2020. The pattern of groundglassopacity suggest infection, including atypical pneumonia/viral infection from atypical agents including COVID 19.    3. Some of the groundglass opacity could be related to edema/fluid overload, as there is also new interlobular septal thickening bilaterally and there are small bilateral pleural effusions.    4. Small ascites.    5. There is a 3.2 cm left ovarian cyst. It is likely a functional cyst.

## 2020-05-23 NOTE — CHART NOTE - NSCHARTNOTEFT_GEN_A_CORE
Admitting Diagnosis:   Patient is a 20y old  Female who presents with a chief complaint of Pneumonia, r/o COVID-19 (23 May 2020 10:09)      Consult: Yes [   ]  No [ x  ]    Reason for Initial Nutrition Assessment:  ICU Length Of Stay     PAST MEDICAL & SURGICAL HISTORY:  Anxiety and depression  History of tonsillectomy      Current Nutrition Order:  NPO after MN for procedure  Previously on a CLD     PO Intake: Excellent (%) [   ]  Good (50-75%) [   ]  Fair (25-50%) [   ]  Poor (<25%) [   ] N/A  Suspected prolonged poor PO intake d/t GI distress    GI Issues:   emesis this am  persistent loose watery stool  ordered for zofram and promethazine    Pain:  no pain reported  febrile Tmax 104.5  tylenol 65mg Q6H    Skin Integrity:  Intact pressure mcgee  Bin score 21    Labs:   05-23    143  |  103  |  2<L>  ----------------------------<  98  3.4<L>   |  25  |  0.59    Ca    8.9      23 May 2020 06:26  Phos  1.8     05-23  Mg     2.3     05-23    TPro  6.9  /  Alb  3.4  /  TBili  0.4  /  DBili  x   /  AST  25  /  ALT  6<L>  /  AlkPhos  74  05-23    CAPILLARY BLOOD GLUCOSE        Nutritionally Pertinent Lab Values:    Medications:  MEDICATIONS  (STANDING):  acetaminophen   Tablet .. 650 milliGRAM(s) Oral every 6 hours  ALBUTerol    90 MICROgram(s) HFA Inhaler 1 Puff(s) Inhalation every 6 hours  azithromycin  IVPB 500 milliGRAM(s) IV Intermittent every 24 hours  benzonatate 100 milliGRAM(s) Oral three times a day  cefTRIAXone   IVPB 2000 milliGRAM(s) IV Intermittent every 24 hours  doxycycline hyclate Capsule 100 milliGRAM(s) Oral every 12 hours  enoxaparin Injectable 40 milliGRAM(s) SubCutaneous every 24 hours  melatonin 3 milliGRAM(s) Oral at bedtime  ondansetron Injectable 4 milliGRAM(s) IV Push once    MEDICATIONS  (PRN):  clonazePAM  Tablet 1 milliGRAM(s) Oral daily PRN anxiety  hydrocodone/homatropine Syrup 5 milliLiter(s) Oral every 6 hours PRN Cough  LORazepam     Tablet 0.5 milliGRAM(s) Oral daily PRN Anxiety  promethazine 25 milliGRAM(s) Oral every 6 hours PRN nausea and vomiting      Admitted Anthropometrics:  Height: 5'3", IBW 115lbs+/-10%, %%, BMI 23.8     Weight: 134lbs  Daily     Weight Change:   Unable to obtain subjective information on diet/weight history at this time.  Nutrition Focused Physical Exam: Completed [   ]  Unable to complete [   ] N/A  Unable to conduct a face to face interview or nutrition-focused physical exam due to limited contact restrictions related to the pt's medical condition and isolation precautions.     Estimated energy needs:   ABW (60.8kg) used for calculations as pt between % of IBW.   Nutrient needs based on [facility] standards of care for maintenance in adults.   Needs adjusted for PNA/inflammatory state, prolonged poor PO intake  1520-1824kcal/day (25-30kcal/kg)  61-73g pro/day (1-1.2g pro/kg)  Fluids per team    Subjective:   21yo F PMH anxiety and depression presenting on 5/21 with fever, cough, chills, running nose, nausea, diarrhea, abd pain x3 days. Possibly 2/2 COVID-19 and/or bacterial process. Initially admitted to [facility], transferred to  for possible  bronchoscopy. Unable to conduct a face to face interview or nutrition-focused physical exam due to limited contact restrictions related to the pt's medical condition and isolation precautions. Pt is now COVID PCR swab negative x2, and was moved to a non COVID ICU on unit. Pt seen from outside room and discussed with RN. Currently NPO for procedure (bronchoscopy). Previously on CLD d/t GI distress. Pt had 1 episode of emesis this am and has had persistent diarrhea over the last 5 days. Has not been able to tolerate much food PO. Ordered for zofran and promethazine. Pt was on 7EA to r/o COVID PNA vs vaping associated lung injury. Meets SIRS criteria; Tmax 104.5, tachychardic, >WBC. CXR with LLL consolidative process; ID consulted for broadening ABX regimen. Unable to obtain subjective information on diet/weight history at this time but suspect poor PO for several days prior to admission. Skin intact, bin score 21. Notable labs: CRP 33.14, k 3.4, BUN 2, ferritin 419, phos 1.8. Recommend advancing back to clear liquids once medically feasible. Please replete PRN. RD to follow.     Nutrition Diagnosis:  Inadequate oral intake RT unable to meet needs w/ NPO diet order AEB meeting 0% EER at present    Goal:  Pt to consistently meet % of estimated needs PO     Recommendations:  1. Recommend advancing back to clears once medically feasible. Encourage EnsureClears as tolerated.  2. Nausea management (zofran, promethazine) PRN  3. Replete lytes PRN (noted low phos and K)  4. Consider metamucil if diarrhea persists and probiotic  *d/w team.     Education:   N/A    Risk Level: High [ x  ] Moderate [   ] Low [   ]

## 2020-05-24 LAB
ALBUMIN SERPL ELPH-MCNC: 2.9 G/DL — LOW (ref 3.3–5)
ALP SERPL-CCNC: 72 U/L — SIGNIFICANT CHANGE UP (ref 40–120)
ALT FLD-CCNC: 6 U/L — LOW (ref 10–45)
ANION GAP SERPL CALC-SCNC: 12 MMOL/L — SIGNIFICANT CHANGE UP (ref 5–17)
APTT BLD: 37.4 SEC — HIGH (ref 27.5–36.3)
AST SERPL-CCNC: 27 U/L — SIGNIFICANT CHANGE UP (ref 10–40)
BASOPHILS # BLD AUTO: 0.04 K/UL — SIGNIFICANT CHANGE UP (ref 0–0.2)
BASOPHILS # BLD AUTO: 0.05 K/UL — SIGNIFICANT CHANGE UP (ref 0–0.2)
BASOPHILS NFR BLD AUTO: 0.2 % — SIGNIFICANT CHANGE UP (ref 0–2)
BASOPHILS NFR BLD AUTO: 0.2 % — SIGNIFICANT CHANGE UP (ref 0–2)
BILIRUB SERPL-MCNC: 0.2 MG/DL — SIGNIFICANT CHANGE UP (ref 0.2–1.2)
BLD GP AB SCN SERPL QL: NEGATIVE — SIGNIFICANT CHANGE UP
BUN SERPL-MCNC: 3 MG/DL — LOW (ref 7–23)
CALCIUM SERPL-MCNC: 8.6 MG/DL — SIGNIFICANT CHANGE UP (ref 8.4–10.5)
CHLORIDE SERPL-SCNC: 98 MMOL/L — SIGNIFICANT CHANGE UP (ref 96–108)
CO2 SERPL-SCNC: 27 MMOL/L — SIGNIFICANT CHANGE UP (ref 22–31)
CREAT SERPL-MCNC: 0.58 MG/DL — SIGNIFICANT CHANGE UP (ref 0.5–1.3)
CRP SERPL-MCNC: 33.57 MG/DL — HIGH (ref 0–0.4)
CULTURE RESULTS: NO GROWTH — SIGNIFICANT CHANGE UP
CULTURE RESULTS: SIGNIFICANT CHANGE UP
D DIMER BLD IA.RAPID-MCNC: 821 NG/ML DDU — HIGH
EOSINOPHIL # BLD AUTO: 0.38 K/UL — SIGNIFICANT CHANGE UP (ref 0–0.5)
EOSINOPHIL # BLD AUTO: 0.4 K/UL — SIGNIFICANT CHANGE UP (ref 0–0.5)
EOSINOPHIL NFR BLD AUTO: 1.5 % — SIGNIFICANT CHANGE UP (ref 0–6)
EOSINOPHIL NFR BLD AUTO: 1.5 % — SIGNIFICANT CHANGE UP (ref 0–6)
FERRITIN SERPL-MCNC: 416 NG/ML — HIGH (ref 15–150)
GLUCOSE SERPL-MCNC: 101 MG/DL — HIGH (ref 70–99)
HCT VFR BLD CALC: 34.3 % — LOW (ref 34.5–45)
HCT VFR BLD CALC: 35.5 % — SIGNIFICANT CHANGE UP (ref 34.5–45)
HGB BLD-MCNC: 11.4 G/DL — LOW (ref 11.5–15.5)
HGB BLD-MCNC: 11.9 G/DL — SIGNIFICANT CHANGE UP (ref 11.5–15.5)
IMM GRANULOCYTES NFR BLD AUTO: 0.6 % — SIGNIFICANT CHANGE UP (ref 0–1.5)
IMM GRANULOCYTES NFR BLD AUTO: 0.7 % — SIGNIFICANT CHANGE UP (ref 0–1.5)
INR BLD: 1.86 — HIGH (ref 0.88–1.16)
LYMPHOCYTES # BLD AUTO: 1.37 K/UL — SIGNIFICANT CHANGE UP (ref 1–3.3)
LYMPHOCYTES # BLD AUTO: 1.57 K/UL — SIGNIFICANT CHANGE UP (ref 1–3.3)
LYMPHOCYTES # BLD AUTO: 5.2 % — LOW (ref 13–44)
LYMPHOCYTES # BLD AUTO: 6.2 % — LOW (ref 13–44)
MAGNESIUM SERPL-MCNC: 2 MG/DL — SIGNIFICANT CHANGE UP (ref 1.6–2.6)
MCHC RBC-ENTMCNC: 30.8 PG — SIGNIFICANT CHANGE UP (ref 27–34)
MCHC RBC-ENTMCNC: 31 PG — SIGNIFICANT CHANGE UP (ref 27–34)
MCHC RBC-ENTMCNC: 33.2 GM/DL — SIGNIFICANT CHANGE UP (ref 32–36)
MCHC RBC-ENTMCNC: 33.5 GM/DL — SIGNIFICANT CHANGE UP (ref 32–36)
MCV RBC AUTO: 92.4 FL — SIGNIFICANT CHANGE UP (ref 80–100)
MCV RBC AUTO: 92.7 FL — SIGNIFICANT CHANGE UP (ref 80–100)
MONOCYTES # BLD AUTO: 0.21 K/UL — SIGNIFICANT CHANGE UP (ref 0–0.9)
MONOCYTES # BLD AUTO: 0.23 K/UL — SIGNIFICANT CHANGE UP (ref 0–0.9)
MONOCYTES NFR BLD AUTO: 0.8 % — LOW (ref 2–14)
MONOCYTES NFR BLD AUTO: 0.9 % — LOW (ref 2–14)
NEUTROPHILS # BLD AUTO: 22.91 K/UL — HIGH (ref 1.8–7.4)
NEUTROPHILS # BLD AUTO: 24.1 K/UL — HIGH (ref 1.8–7.4)
NEUTROPHILS NFR BLD AUTO: 90.7 % — HIGH (ref 43–77)
NEUTROPHILS NFR BLD AUTO: 91.5 % — HIGH (ref 43–77)
NRBC # BLD: 0 /100 WBCS — SIGNIFICANT CHANGE UP (ref 0–0)
NRBC # BLD: 0 /100 WBCS — SIGNIFICANT CHANGE UP (ref 0–0)
NT-PROBNP SERPL-SCNC: 164 PG/ML — SIGNIFICANT CHANGE UP (ref 0–300)
PHOSPHATE SERPL-MCNC: 2.5 MG/DL — SIGNIFICANT CHANGE UP (ref 2.5–4.5)
PLATELET # BLD AUTO: 314 K/UL — SIGNIFICANT CHANGE UP (ref 150–400)
PLATELET # BLD AUTO: 352 K/UL — SIGNIFICANT CHANGE UP (ref 150–400)
POTASSIUM SERPL-MCNC: 3.9 MMOL/L — SIGNIFICANT CHANGE UP (ref 3.5–5.3)
POTASSIUM SERPL-SCNC: 3.9 MMOL/L — SIGNIFICANT CHANGE UP (ref 3.5–5.3)
PROT SERPL-MCNC: 6.5 G/DL — SIGNIFICANT CHANGE UP (ref 6–8.3)
PROTHROM AB SERPL-ACNC: 21.6 SEC — HIGH (ref 10–12.9)
RBC # BLD: 3.7 M/UL — LOW (ref 3.8–5.2)
RBC # BLD: 3.84 M/UL — SIGNIFICANT CHANGE UP (ref 3.8–5.2)
RBC # FLD: 12.6 % — SIGNIFICANT CHANGE UP (ref 10.3–14.5)
RBC # FLD: 12.9 % — SIGNIFICANT CHANGE UP (ref 10.3–14.5)
RH IG SCN BLD-IMP: POSITIVE — SIGNIFICANT CHANGE UP
SARS-COV-2 RNA SPEC QL NAA+PROBE: SIGNIFICANT CHANGE UP
SODIUM SERPL-SCNC: 137 MMOL/L — SIGNIFICANT CHANGE UP (ref 135–145)
SPECIMEN SOURCE: SIGNIFICANT CHANGE UP
SPECIMEN SOURCE: SIGNIFICANT CHANGE UP
TROPONIN T SERPL-MCNC: <0.01 NG/ML — SIGNIFICANT CHANGE UP (ref 0–0.01)
WBC # BLD: 25.83 K/UL — HIGH (ref 3.8–10.5)
WBC # BLD: 26.32 K/UL — HIGH (ref 3.8–10.5)
WBC # FLD AUTO: 25.83 K/UL — HIGH (ref 3.8–10.5)
WBC # FLD AUTO: 26.32 K/UL — HIGH (ref 3.8–10.5)

## 2020-05-24 PROCEDURE — 99291 CRITICAL CARE FIRST HOUR: CPT

## 2020-05-24 PROCEDURE — 99233 SBSQ HOSP IP/OBS HIGH 50: CPT

## 2020-05-24 RX ORDER — LANOLIN ALCOHOL/MO/W.PET/CERES
5 CREAM (GRAM) TOPICAL AT BEDTIME
Refills: 0 | Status: DISCONTINUED | OUTPATIENT
Start: 2020-05-24 | End: 2020-05-25

## 2020-05-24 RX ORDER — ENOXAPARIN SODIUM 100 MG/ML
60 INJECTION SUBCUTANEOUS EVERY 12 HOURS
Refills: 0 | Status: DISCONTINUED | OUTPATIENT
Start: 2020-05-24 | End: 2020-05-24

## 2020-05-24 RX ORDER — ONDANSETRON 8 MG/1
4 TABLET, FILM COATED ORAL ONCE
Refills: 0 | Status: COMPLETED | OUTPATIENT
Start: 2020-05-24 | End: 2020-05-25

## 2020-05-24 RX ORDER — ENOXAPARIN SODIUM 100 MG/ML
40 INJECTION SUBCUTANEOUS EVERY 24 HOURS
Refills: 0 | Status: DISCONTINUED | OUTPATIENT
Start: 2020-05-25 | End: 2020-05-28

## 2020-05-24 RX ADMIN — AZITHROMYCIN 255 MILLIGRAM(S): 500 TABLET, FILM COATED ORAL at 17:44

## 2020-05-24 RX ADMIN — CEFTRIAXONE 100 MILLIGRAM(S): 500 INJECTION, POWDER, FOR SOLUTION INTRAMUSCULAR; INTRAVENOUS at 21:30

## 2020-05-24 RX ADMIN — Medication 5 MILLIGRAM(S): at 21:30

## 2020-05-24 RX ADMIN — Medication 650 MILLIGRAM(S): at 05:27

## 2020-05-24 RX ADMIN — Medication 100 MILLIGRAM(S): at 05:27

## 2020-05-24 RX ADMIN — Medication 0.5 MILLIGRAM(S): at 18:50

## 2020-05-24 RX ADMIN — Medication 0.5 MILLIGRAM(S): at 21:30

## 2020-05-24 RX ADMIN — Medication 100 MILLIGRAM(S): at 17:03

## 2020-05-24 RX ADMIN — Medication 100 MILLIGRAM(S): at 21:30

## 2020-05-24 RX ADMIN — Medication 650 MILLIGRAM(S): at 17:04

## 2020-05-24 RX ADMIN — Medication 30 MILLIGRAM(S): at 17:03

## 2020-05-24 RX ADMIN — Medication 100 MILLIGRAM(S): at 12:01

## 2020-05-24 RX ADMIN — ENOXAPARIN SODIUM 60 MILLIGRAM(S): 100 INJECTION SUBCUTANEOUS at 05:26

## 2020-05-24 RX ADMIN — Medication 650 MILLIGRAM(S): at 23:10

## 2020-05-24 RX ADMIN — Medication 650 MILLIGRAM(S): at 11:36

## 2020-05-24 RX ADMIN — Medication 0.5 MILLIGRAM(S): at 01:19

## 2020-05-24 RX ADMIN — Medication 30 MILLIGRAM(S): at 11:57

## 2020-05-24 NOTE — PROGRESS NOTE ADULT - SUBJECTIVE AND OBJECTIVE BOX
INTERVAL HPI/OVERNIGHT EVENTS:    Patient was seen and examined at bedside.  Continues to have diarrhea.  Feels fatigued.  Fevers continue.  Now on high flow NC    CONSTITUTIONAL:  + fevers and chills, feels weak  EYES:  Negative  blurry vision or double vision  CARDIOVASCULAR:  Negative for chest pain or palpitations  RESPIRATORY:  + cough, wheezing, or SOB   GASTROINTESTINAL:  Negative for nausea, vomiting, + diarrhea, no constipation or abdominal pain  GENITOURINARY:  Negative frequency, urgency or dysuria  NEUROLOGIC:  No headache, confusion, dizziness, lightheadedness      ANTIBIOTICS/RELEVANT:    MEDICATIONS  (STANDING):  acetaminophen   Tablet .. 650 milliGRAM(s) Oral every 6 hours  ALBUTerol    90 MICROgram(s) HFA Inhaler 1 Puff(s) Inhalation every 6 hours  azithromycin  IVPB 500 milliGRAM(s) IV Intermittent every 24 hours  benzonatate 100 milliGRAM(s) Oral three times a day  cefTRIAXone   IVPB 2000 milliGRAM(s) IV Intermittent every 24 hours  doxycycline hyclate Capsule 100 milliGRAM(s) Oral every 12 hours  melatonin 3 milliGRAM(s) Oral at bedtime  methylPREDNISolone sodium succinate Injectable 30 milliGRAM(s) IV Push two times a day  ondansetron   Disintegrating Tablet 4 milliGRAM(s) Oral once  ondansetron Injectable 4 milliGRAM(s) IV Push once    MEDICATIONS  (PRN):  clonazePAM  Tablet 1 milliGRAM(s) Oral daily PRN anxiety  hydrocodone/homatropine Syrup 5 milliLiter(s) Oral every 6 hours PRN Cough  LORazepam     Tablet 0.5 milliGRAM(s) Oral daily PRN Anxiety  promethazine 25 milliGRAM(s) Oral every 6 hours PRN nausea and vomiting        Vital Signs Last 24 Hrs  T(C): 38 (24 May 2020 15:00), Max: 39.3 (24 May 2020 06:00)  T(F): 100.4 (24 May 2020 15:00), Max: 102.7 (24 May 2020 06:00)  HR: 86 (24 May 2020 15:00) (75 - 143)  BP: 110/57 (24 May 2020 14:00) (105/57 - 124/68)  BP(mean): 77 (24 May 2020 14:00) (75 - 91)  RR: 42 (24 May 2020 15:00) (28 - 55)  SpO2: 94% (24 May 2020 15:00) (84% - 100%)    PHYSICAL EXAM:  Constitutional:  ill appearing  Eyes: no icterus   Ear/Nose/Throat: no oral lesion  Neck:  supple  Respiratory:  clear to auscultation   Cardiovascular: S1S2 RRR, no murmurs  Gastrointestinal:soft, (+) BS, no HSM  Extremities:no edema  Vascular: DP Pulse:	right normal; left normal      LABS:                        11.9   26.32 )-----------( 352      ( 24 May 2020 07:46 )             35.5     05-24    137  |  98  |  3<L>  ----------------------------<  101<H>  3.9   |  27  |  0.58    Ca    8.6      24 May 2020 01:55  Phos  2.5     05-24  Mg     2.0     05-24    TPro  6.5  /  Alb  2.9<L>  /  TBili  0.2  /  DBili  x   /  AST  27  /  ALT  6<L>  /  AlkPhos  72  05-24    PT/INR - ( 24 May 2020 01:55 )   PT: 21.6 sec;   INR: 1.86          PTT - ( 24 May 2020 01:55 )  PTT:37.4 sec      MICROBIOLOGY:    Culture - Urine (05.23.20 @ 08:45)    Specimen Source: .Urine Clean Catch (Midstream)    Culture Results:   No growth    GI PCR Panel, Stool (05.22.20 @ 15:01)    Culture Results:   GI PCR Results: NOT detected  *******Please Note:*******  GI panel PCR evaluates for:  Campylobacter, Plesiomonas shigelloides, Salmonella,  Vibrio, Yersinia enterocolitica, Enteroaggregative  Escherichia coli (EAEC), Enteropathogenic E.coli (EPEC),  Enterotoxigenic E. coli (ETEC) lt/st, Shiga-like  toxin-producing E. coli (STEC) stx1/stx2,  Shigella/ Enteroinvasive E. coli (EIEC), Cryptosporidium,  Cyclospora cayetanensis, Entamoeba histolytica,  Giardia lamblia, Adenovirus F 40/41, Astrovirus,  Norovirus GI/GII, Rotavirus A, Sapovirus        Culture - Stool (05.22.20 @ 09:54)    Specimen Source: .Stool Feces    Culture Results:   No enteric pathogens recovered. Specimen screened for  Salmonella, Shigella, Campylobacter, E.Coli 0157:H7 and Shiga-like  producing organisms.      Culture - Blood (05.21.20 @ 15:02)    Specimen Source: .Blood Blood    Culture Results:   No growth at 2 days.      RADIOLOGY & ADDITIONAL STUDIES:    < from: Xray Chest 1 View- PORTABLE-Urgent (05.23.20 @ 23:49) >    Portable examination of the chest demonstrates progression lung infiltrates in comparison to prior examination of the chest 5/23/2020. The heart is within normal limits in transverse diameter. Visualized osseous structures are within normal limits.    Impression: Progression bilateral infiltrates

## 2020-05-24 NOTE — PROGRESS NOTE ADULT - ASSESSMENT
Pt is a 20F PMH anxiety and depression presenting on 5/21 with fever, cough, chills, running nose, nausea, diarrhea, abd pain x3 days. Possibly 2/2 COVID-19 and/or bacterial process. Initially admitted to [facility], transferred to  for possible  bronchoscopy. Now COVID PCR swab negative x2, transferred to non COVID ICU on 5/23.     Neuro  - No active issues    Respiratory  #COVID pneumonia vs vaping-associated lung injury (BELGICA)  Pt presents with fever/chills, cough; WBC 18, lymphopenic with ALC 0.87, CT chest with bilateral ground glass opacities. marijuana vape pod use, most recently 3 days prior to admission  -CTA 5/22 neg for PE but atypical for bacterial pneumonia, with worsening b/l ground glass opacities, cannot r/o edema/fluid overload (s/p 2L NS in the ED and fluid gtt for 12hours), If non-infectious, other atypical pathogens (PCP) are also possible. Some of these findings are likely confounded by IV fluid administration though, given the small pleural effusion and septal thickening concurrently seen with the GGO's. Bedside ultrasound also showed thick, fan-like b-lines bilaterally suggestive of pulmonary edema. Low suspicion for autoimmune given no systemic manifestations or family history. Given her history of e-cigarette use with marijuana cartridges (uses everyday, last use 2 days ago) she is highly suspicious for EVALI.   - overnight patient tachycardic and tachypneic requiring HFNC, chest x-ray with slightly worsening right-sided effusion  - started solumedrol 30mg BID  - If her clinical status becomes worse including oxygenation and/or radiographically, then will likely need a bronchoscopy with TBBx and BAL to r/o infx including atypicals such as PCP.   - f/u HIV test and Autoimmune panel (TIFFANY, RF, CCP, Sjogren's,)  - obtain echo given ?pulmonary edema   - COVID swab PCR neg x2 and antibody negative  - Currently saturating 100% on HFNC, although desats to high 80s/low 90s with coughing on room air  - Hycodan, tessalon perle for cough suppression    #Bacterial pneumonia  Pt febrile to 103.3F, tachycardic to 131, WBC 20 (3/4 SIRS criteria). CXR with LLL consolidative process  - MRSA swab negative  - blood cx NGTD   - f/u urine legionella negative  - c/w CTX/Azithro started on 5/22, and doxycyline 100mg BID as per ID Given recent outdoor exposure  - f/u send tick-borne disease antibodies, EHRLICHIA/ANAPLASMA PCR on serum.     ID  - Management as above    Cardiovascular  - No active issues    GI  #Diarrhea  - Pt reports four days of loose stools, non-bloody with no mucus  - GI PCR negative  - f/u stool culture  - Continue to monitor    #Nausea  - Promethazine for nausea/vomiting    #Nutrition  - Pt unable to tolerate much PO  - Clear liquid diet    Renal  - BUN/Cr 3/0.58    Heme  - none    Psych  #Anxiety  Pt on klonopin 1mg daily PRN and is noncompliant with lexapro 20mg daily  - Continue klonopin 1mg daily PRN    Endocrine  - No active issues    F: None  E: Replete PRN for K<4, Mg<2  N: Clear liquid  DVT PPx: LSQ DVT ppx   GI PPx: None  Dispo: NONCOVID MICU Pt is a 20F PMH anxiety and depression presenting on 5/21 with fever, cough, chills, running nose, nausea, diarrhea, abd pain x3 days. Possibly 2/2 COVID-19 and/or bacterial process. Initially admitted to [facility], transferred to  for possible  bronchoscopy. Now COVID PCR swab negative x2, transferred to non COVID ICU on 5/23.     Neuro  - No active issues    Respiratory  #COVID pneumonia vs vaping-associated lung injury (BELGICA)  Pt presents with fever/chills, cough; WBC 18, lymphopenic with ALC 0.87, CT chest with bilateral ground glass opacities. marijuana vape pod use, most recently 3 days prior to admission  -CTA 5/22 neg for PE but atypical for bacterial pneumonia, with worsening b/l ground glass opacities, cannot r/o edema/fluid overload (s/p 2L NS in the ED and fluid gtt for 12hours), If non-infectious, other atypical pathogens (PCP) are also possible. Some of these findings are likely confounded by IV fluid administration though, given the small pleural effusion and septal thickening concurrently seen with the GGO's. Bedside ultrasound also showed thick, fan-like b-lines bilaterally suggestive of pulmonary edema which likely is noncardiogenic. Low suspicion for autoimmune given no systemic manifestations or family history. Given her history of e-cigarette use with marijuana cartridges (uses everyday, last use 2 days ago) she is highly suspicious for EVALI.   - overnight patient tachycardic and tachypneic requiring HFNC, chest x-ray with slightly worsening right-sided effusion  - started solumedrol 30mg BID  - If her clinical status becomes worse including oxygenation and/or radiographically, then will likely need a bronchoscopy with TBBx and BAL to r/o infx including atypicals such as PCP.   - f/u HIV test and Autoimmune panel (TIFFANY, RF, CCP, Sjogren's,)  - obtain echo given ?pulmonary edema   - COVID swab PCR neg x2 and antibody negative  - Currently saturating 100% on HFNC at 30%, although desats to high 80s/low 90s with coughing on room air  - Hycodanciprianoon perle for cough suppression    #Bacterial pneumonia  Pt febrile to 103.3F, tachycardic to 131, WBC 20 (3/4 SIRS criteria). CXR with LLL consolidative process  - MRSA swab negative  - blood cx NGTD   - f/u urine legionella negative  - c/w CTX/Azithro started on 5/22, and doxycyline 100mg BID as per ID Given recent outdoor exposure  - f/u send tick-borne disease antibodies, EHRLICHIA/ANAPLASMA PCR on serum.     ID  - Management as above    Cardiovascular  - No active issues    GI  #Diarrhea  - Pt reports four days of loose stools, non-bloody with no mucus  - GI PCR negative  - f/u stool culture  - Continue to monitor    #Nausea  - Promethazine for nausea/vomiting    #Nutrition  - Pt unable to tolerate much PO  - Clear liquid diet    Renal  - BUN/Cr 3/0.58    Heme  - none    Psych  #Anxiety  Pt on klonopin 1mg daily PRN and is noncompliant with lexapro 20mg daily  - Continue klonopin 1mg daily PRN    Endocrine  - No active issues    F: None  E: Replete PRN for K<4, Mg<2  N: Clear liquid  DVT PPx: LSQ DVT ppx   GI PPx: None  Dispo: NONCOVID MICU Pt is a 20F PMH anxiety and depression presenting on 5/21 with fever, cough, chills, running nose, nausea, diarrhea, abd pain x3 days. Possibly 2/2 COVID-19 (negative) and/or bacterial process (CAP) with sepsis. Initially admitted to [facility], transferred to  for possible  bronchoscopy. Now COVID PCR swab negative x2, transferred to non COVID ICU on 5/23.     Neuro  - No active issues    Respiratory  #COVID pneumonia vs vaping-associated lung injury (BELGICA)  Pt presents with fever/chills, cough; WBC 18, lymphopenic with ALC 0.87, CT chest with bilateral ground glass opacities. marijuana vape pod use, most recently 3 days prior to admission  -CTA 5/22 neg for PE but atypical for bacterial pneumonia, with worsening b/l ground glass opacities, cannot r/o edema/fluid overload (s/p 2L NS in the ED and fluid gtt for 12hours), If non-infectious, other atypical pathogens (PCP) are also possible. Some of these findings are likely confounded by IV fluid administration though, given the small pleural effusion and septal thickening concurrently seen with the GGO's. Bedside ultrasound also showed thick, fan-like b-lines bilaterally suggestive of pulmonary edema which likely is noncardiogenic. Low suspicion for autoimmune given no systemic manifestations or family history. Given her history of e-cigarette use with marijuana cartridges (uses everyday, last use 2 days ago) she is highly suspicious for EVALI.   - overnight patient tachycardic and tachypneic requiring HFNC, chest x-ray with slightly worsening right-sided effusion  - started solumedrol 30mg BID  - If her clinical status becomes worse including oxygenation and/or radiographically, then will likely need a bronchoscopy with TBBx and BAL to r/o infx including atypicals such as PCP.   - f/u HIV test and Autoimmune panel (TIFFANY, RF, CCP, Sjogren's,)  - obtain echo given ?pulmonary edema   - COVID swab PCR neg x2 and antibody negative  - Currently saturating 100% on HFNC at 30%, although desats to high 80s/low 90s with coughing on room air  - Hycodan, tessalon perle for cough suppression    #Bacterial pneumonia  Pt febrile to 103.3F, tachycardic to 131, WBC 20 (3/4 SIRS criteria). CXR with LLL consolidative process  - MRSA swab negative  - blood cx NGTD   - f/u urine legionella negative  - c/w CTX/Azithro started on 5/22, and doxycyline 100mg BID as per ID Given recent outdoor exposure  - f/u send tick-borne disease antibodies, EHRLICHIA/ANAPLASMA PCR on serum.     ID  - Management as above    Cardiovascular  - No active issues    GI  #Diarrhea  - Pt reports four days of loose stools, non-bloody with no mucus  - GI PCR negative  - f/u stool culture  - Continue to monitor    #Nausea  - Promethazine for nausea/vomiting    #Nutrition  - Pt unable to tolerate much PO  - Clear liquid diet    Renal  - BUN/Cr 3/0.58    Heme  - none    Psych  #Anxiety  Pt on klonopin 1mg daily PRN and is noncompliant with lexapro 20mg daily  - Continue klonopin 1mg daily PRN    Endocrine  - No active issues    F: None  E: Replete PRN for K<4, Mg<2  N: Clear liquid  DVT PPx: LSQ DVT ppx   GI PPx: None  Dispo: NONCOVID MICU

## 2020-05-24 NOTE — PROGRESS NOTE ADULT - SUBJECTIVE AND OBJECTIVE BOX
OVERNIGHT EVENTS:  initially on 4L NC but became tachypneic and saturating to 88% on 6L NC, with tachycardic episodes (HR 120s) when coughing. given additional dose of hycadone and escalted to HFNC (60% FiO2 at 40L/min). repeat chest x-ray with slighlty worsening right-sided fluffiness but improved left lower lobe opacity. bedside ultrasound showed normal sized ventricles and if anything hypovlemic volume status. . plan to escalate to nebulized lidocaine if coughing persists. sent CRP, ferritin, d-dimer and if d-dimer significantly elevated, plan to repeat CTPE given increased O2 requirements.     SUBJECTIVE:  Patient seen and examined at bedside. She is anxious appearing but in no acute distress. She complains of having loose stools with 3 bowel movements a day and most recent one being this morning. She complains of abdominal pain mostly on the right side and endorses some nausea but no vomiting. She also was febrile overnight but denies any rigors or chills. She had an episode of blood streaked sputum overnight but denies any further episodes. Otherwise, she denies any chest pain and is saturating well on HFNC but admits to feeling anxious.    Vital Signs Last 12 Hrs  T(F): 102.2 (05-24-20 @ 12:00), Max: 102.7 (05-24-20 @ 06:00)  HR: 98 (05-24-20 @ 12:00) (76 - 143)  BP: 122/72 (05-24-20 @ 07:00) (105/57 - 122/82)  BP(mean): 91 (05-24-20 @ 07:00) (77 - 91)  RR: 44 (05-24-20 @ 12:00) (35 - 46)  SpO2: 99% (05-24-20 @ 12:00) (84% - 100%)  I&O's Summary    23 May 2020 07:01  -  24 May 2020 07:00  --------------------------------------------------------  IN: 786.6 mL / OUT: 353 mL / NET: 433.6 mL        PHYSICAL EXAM:  General: anxious-appearing, sleeping comfortably on HFNC, but uncomfortable appearing, answering questions   HEENT: PERRLA, EOMI, MMM  Neck: No LAD, No JVD  Respiratory: Slight crackles right lower lobe, no W/R/R. No accessory muscle use. On HFNC, Occasional coughing fits, non-productive  Cardiovascular: S1/S2, tachycardic rate and regular rhythm, no M/R/G  Gastrointestinal: BS+, soft, tender on the RUQ, bowel sounds present in all 4 quadrants.   Extremities: No peripheral edema, WWP  Vascular: 2+ peripheral pulses   Neurological: AAO x 3, CN V-XII grossly intact.      LABS:                        11.9   26.32 )-----------( 352      ( 24 May 2020 07:46 )             35.5     05-24    137  |  98  |  3<L>  ----------------------------<  101<H>  3.9   |  27  |  0.58    Ca    8.6      24 May 2020 01:55  Phos  2.5     05-24  Mg     2.0     05-24    TPro  6.5  /  Alb  2.9<L>  /  TBili  0.2  /  DBili  x   /  AST  27  /  ALT  6<L>  /  AlkPhos  72  05-24    PT/INR - ( 24 May 2020 01:55 )   PT: 21.6 sec;   INR: 1.86          PTT - ( 24 May 2020 01:55 )  PTT:37.4 sec        RADIOLOGY & ADDITIONAL TESTS:    MEDICATIONS  (STANDING):  acetaminophen   Tablet .. 650 milliGRAM(s) Oral every 6 hours  ALBUTerol    90 MICROgram(s) HFA Inhaler 1 Puff(s) Inhalation every 6 hours  azithromycin  IVPB 500 milliGRAM(s) IV Intermittent every 24 hours  benzonatate 100 milliGRAM(s) Oral three times a day  cefTRIAXone   IVPB 2000 milliGRAM(s) IV Intermittent every 24 hours  doxycycline hyclate Capsule 100 milliGRAM(s) Oral every 12 hours  melatonin 3 milliGRAM(s) Oral at bedtime  methylPREDNISolone sodium succinate Injectable 30 milliGRAM(s) IV Push two times a day  ondansetron   Disintegrating Tablet 4 milliGRAM(s) Oral once  ondansetron Injectable 4 milliGRAM(s) IV Push once    MEDICATIONS  (PRN):  clonazePAM  Tablet 1 milliGRAM(s) Oral daily PRN anxiety  hydrocodone/homatropine Syrup 5 milliLiter(s) Oral every 6 hours PRN Cough  LORazepam     Tablet 0.5 milliGRAM(s) Oral daily PRN Anxiety  promethazine 25 milliGRAM(s) Oral every 6 hours PRN nausea and vomiting OVERNIGHT EVENTS:  initially on 4L NC but became tachypneic and saturating to 88% on 6L NC, with tachycardic episodes (HR 120s) when coughing. given additional dose of hycadone and escalted to HFNC (60% FiO2 at 40L/min). repeat chest x-ray with slighlty worsening right-sided fluffiness but improved left lower lobe opacity. bedside ultrasound showed normal sized ventricles and if anything hypovlemic volume status. . plan to escalate to nebulized lidocaine if coughing persists. sent CRP, ferritin, d-dimer and if d-dimer significantly elevated, plan to repeat CTPE given increased O2 requirements.     SUBJECTIVE:  Patient seen and examined at bedside. She is anxious appearing but in no acute distress. She complains of having loose stools with 3 bowel movements a day and most recent one being this morning. She complains of abdominal pain mostly on the right side and endorses some nausea but no vomiting. She also was febrile overnight but denies any rigors or chills. She had an episode of blood streaked sputum overnight but denies any further episodes. Otherwise, she denies any chest pain and is saturating well on HFNC but admits to feeling anxious.    Vital Signs Last 12 Hrs  T(F): 102.2 (05-24-20 @ 12:00), Max: 102.7 (05-24-20 @ 06:00)  HR: 98 (05-24-20 @ 12:00) (76 - 143)  BP: 122/72 (05-24-20 @ 07:00) (105/57 - 122/82)  BP(mean): 91 (05-24-20 @ 07:00) (77 - 91)  RR: 44 (05-24-20 @ 12:00) (35 - 46)  SpO2: 99% (05-24-20 @ 12:00) (84% - 100%)  I&O's Summary    23 May 2020 07:01  -  24 May 2020 07:00  --------------------------------------------------------  IN: 786.6 mL / OUT: 353 mL / NET: 433.6 mL        PHYSICAL EXAM:  General: anxious-appearing, sleeping comfortably on HFNC, but uncomfortable appearing, answering questions   HEENT: PERRLA, EOMI, MMM  Neck: No LAD, No JVD  Respiratory: Slight crackles right lower lobe, no W/R/R. No accessory muscle use. On HFNC, Occasional coughing fits, non-productive  Cardiovascular: S1/S2, tachycardic rate and regular rhythm, no M/R/G  Gastrointestinal: BS+, soft, tender on the RUQ, bowel sounds present in all 4 quadrants.   Extremities: No peripheral edema, WWP  Vascular: 2+ peripheral pulses   Neurological: AAO x 3, CN V-XII grossly intact.  Skin: no rash      LABS:                        11.9   26.32 )-----------( 352      ( 24 May 2020 07:46 )             35.5     05-24    137  |  98  |  3<L>  ----------------------------<  101<H>  3.9   |  27  |  0.58    Ca    8.6      24 May 2020 01:55  Phos  2.5     05-24  Mg     2.0     05-24    TPro  6.5  /  Alb  2.9<L>  /  TBili  0.2  /  DBili  x   /  AST  27  /  ALT  6<L>  /  AlkPhos  72  05-24    PT/INR - ( 24 May 2020 01:55 )   PT: 21.6 sec;   INR: 1.86          PTT - ( 24 May 2020 01:55 )  PTT:37.4 sec        RADIOLOGY & ADDITIONAL TESTS:    MEDICATIONS  (STANDING):  acetaminophen   Tablet .. 650 milliGRAM(s) Oral every 6 hours  ALBUTerol    90 MICROgram(s) HFA Inhaler 1 Puff(s) Inhalation every 6 hours  azithromycin  IVPB 500 milliGRAM(s) IV Intermittent every 24 hours  benzonatate 100 milliGRAM(s) Oral three times a day  cefTRIAXone   IVPB 2000 milliGRAM(s) IV Intermittent every 24 hours  doxycycline hyclate Capsule 100 milliGRAM(s) Oral every 12 hours  melatonin 3 milliGRAM(s) Oral at bedtime  methylPREDNISolone sodium succinate Injectable 30 milliGRAM(s) IV Push two times a day  ondansetron   Disintegrating Tablet 4 milliGRAM(s) Oral once  ondansetron Injectable 4 milliGRAM(s) IV Push once    MEDICATIONS  (PRN):  clonazePAM  Tablet 1 milliGRAM(s) Oral daily PRN anxiety  hydrocodone/homatropine Syrup 5 milliLiter(s) Oral every 6 hours PRN Cough  LORazepam     Tablet 0.5 milliGRAM(s) Oral daily PRN Anxiety  promethazine 25 milliGRAM(s) Oral every 6 hours PRN nausea and vomiting

## 2020-05-24 NOTE — PROGRESS NOTE ADULT - ASSESSMENT
IMPRESSION:  Febrile illness --> concern for COVID-19 infection however both PCR and antibodies are negative making this less likely.  Community-acquired pneumonia is also on the differential.  She is being well-covered for the typical CAP pathogens with Ceftriaxone and Azithromycin but she has not responded at this time.     She has no risk factors for MRSA, pseudomonas aeruginosa and ESBL Enterobactericae    In her history the only recent significant epidemiological exposure was hiking in [facility] which puts her at risk for tick borne illness; however this would not explain her respiratory symptoms     Given diarrhea, leukocytosis, fevers, and recent antibiotic it is reasonable to check for C. Diff.  The remainder of her infectious diarrhea work up is negative.  Diarrhea may be due to pneumonia    Recommend:  1.  Can continue Ceftriaxone and Azithromycin for pending further work up  2.  Continue Doxycycline 100 mg PO q12.  If tick serologies and Ehrlichia/Anaplasma PCR negative, this can be stopped  3.  If patient clinically declines, I have a low threshold to stop Ceftriaxone and start Linezolid/Zosyn for broader coverage   4  Check stool for C. Diff  5.  Will recommend repeat COVID-19 IgG in one week   6.  Follow up strep pneumoniae antigen  7.  Follow up fungitel (low suspicion for PCP given no evidence of immune suppression)    ID team 1 will follow

## 2020-05-25 LAB
ALBUMIN SERPL ELPH-MCNC: 2.8 G/DL — LOW (ref 3.3–5)
ALP SERPL-CCNC: 102 U/L — SIGNIFICANT CHANGE UP (ref 40–120)
ALT FLD-CCNC: 8 U/L — LOW (ref 10–45)
ANION GAP SERPL CALC-SCNC: 13 MMOL/L — SIGNIFICANT CHANGE UP (ref 5–17)
APTT BLD: 33.3 SEC — SIGNIFICANT CHANGE UP (ref 27.5–36.3)
AST SERPL-CCNC: 29 U/L — SIGNIFICANT CHANGE UP (ref 10–40)
BASOPHILS # BLD AUTO: 0.05 K/UL — SIGNIFICANT CHANGE UP (ref 0–0.2)
BASOPHILS NFR BLD AUTO: 0.2 % — SIGNIFICANT CHANGE UP (ref 0–2)
BILIRUB SERPL-MCNC: 0.2 MG/DL — SIGNIFICANT CHANGE UP (ref 0.2–1.2)
BUN SERPL-MCNC: 6 MG/DL — LOW (ref 7–23)
CALCIUM SERPL-MCNC: 9 MG/DL — SIGNIFICANT CHANGE UP (ref 8.4–10.5)
CHLORIDE SERPL-SCNC: 97 MMOL/L — SIGNIFICANT CHANGE UP (ref 96–108)
CO2 SERPL-SCNC: 28 MMOL/L — SIGNIFICANT CHANGE UP (ref 22–31)
CREAT SERPL-MCNC: 0.45 MG/DL — LOW (ref 0.5–1.3)
EOSINOPHIL # BLD AUTO: 0 K/UL — SIGNIFICANT CHANGE UP (ref 0–0.5)
EOSINOPHIL NFR BLD AUTO: 0 % — SIGNIFICANT CHANGE UP (ref 0–6)
GLUCOSE SERPL-MCNC: 124 MG/DL — HIGH (ref 70–99)
HCT VFR BLD CALC: 38.1 % — SIGNIFICANT CHANGE UP (ref 34.5–45)
HGB BLD-MCNC: 12.6 G/DL — SIGNIFICANT CHANGE UP (ref 11.5–15.5)
IMM GRANULOCYTES NFR BLD AUTO: 1 % — SIGNIFICANT CHANGE UP (ref 0–1.5)
INR BLD: 1.87 — HIGH (ref 0.88–1.16)
LYMPHOCYTES # BLD AUTO: 1.28 K/UL — SIGNIFICANT CHANGE UP (ref 1–3.3)
LYMPHOCYTES # BLD AUTO: 4.3 % — LOW (ref 13–44)
MAGNESIUM SERPL-MCNC: 2.4 MG/DL — SIGNIFICANT CHANGE UP (ref 1.6–2.6)
MCHC RBC-ENTMCNC: 30.6 PG — SIGNIFICANT CHANGE UP (ref 27–34)
MCHC RBC-ENTMCNC: 33.1 GM/DL — SIGNIFICANT CHANGE UP (ref 32–36)
MCV RBC AUTO: 92.5 FL — SIGNIFICANT CHANGE UP (ref 80–100)
MONOCYTES # BLD AUTO: 0.43 K/UL — SIGNIFICANT CHANGE UP (ref 0–0.9)
MONOCYTES NFR BLD AUTO: 1.4 % — LOW (ref 2–14)
NEUTROPHILS # BLD AUTO: 27.7 K/UL — HIGH (ref 1.8–7.4)
NEUTROPHILS NFR BLD AUTO: 93.1 % — HIGH (ref 43–77)
NRBC # BLD: 0 /100 WBCS — SIGNIFICANT CHANGE UP (ref 0–0)
PHOSPHATE SERPL-MCNC: 3.9 MG/DL — SIGNIFICANT CHANGE UP (ref 2.5–4.5)
PLATELET # BLD AUTO: 447 K/UL — HIGH (ref 150–400)
POTASSIUM SERPL-MCNC: 4.2 MMOL/L — SIGNIFICANT CHANGE UP (ref 3.5–5.3)
POTASSIUM SERPL-SCNC: 4.2 MMOL/L — SIGNIFICANT CHANGE UP (ref 3.5–5.3)
PROT SERPL-MCNC: 6.5 G/DL — SIGNIFICANT CHANGE UP (ref 6–8.3)
PROTHROM AB SERPL-ACNC: 21.7 SEC — HIGH (ref 10–12.9)
RBC # BLD: 4.12 M/UL — SIGNIFICANT CHANGE UP (ref 3.8–5.2)
RBC # FLD: 12.6 % — SIGNIFICANT CHANGE UP (ref 10.3–14.5)
S PNEUM AG UR QL: NEGATIVE — SIGNIFICANT CHANGE UP
S PNEUM AG UR QL: NEGATIVE — SIGNIFICANT CHANGE UP
SODIUM SERPL-SCNC: 138 MMOL/L — SIGNIFICANT CHANGE UP (ref 135–145)
WBC # BLD: 29.77 K/UL — HIGH (ref 3.8–10.5)
WBC # FLD AUTO: 29.77 K/UL — HIGH (ref 3.8–10.5)

## 2020-05-25 PROCEDURE — 99232 SBSQ HOSP IP/OBS MODERATE 35: CPT

## 2020-05-25 PROCEDURE — 99291 CRITICAL CARE FIRST HOUR: CPT

## 2020-05-25 PROCEDURE — 71045 X-RAY EXAM CHEST 1 VIEW: CPT | Mod: 26

## 2020-05-25 RX ORDER — ONDANSETRON 8 MG/1
4 TABLET, FILM COATED ORAL ONCE
Refills: 0 | Status: COMPLETED | OUTPATIENT
Start: 2020-05-25 | End: 2020-05-25

## 2020-05-25 RX ORDER — FLUCONAZOLE 150 MG/1
150 TABLET ORAL ONCE
Refills: 0 | Status: COMPLETED | OUTPATIENT
Start: 2020-05-25 | End: 2020-05-25

## 2020-05-25 RX ORDER — OXYCODONE HYDROCHLORIDE 5 MG/1
10 TABLET ORAL EVERY 12 HOURS
Refills: 0 | Status: DISCONTINUED | OUTPATIENT
Start: 2020-05-25 | End: 2020-05-27

## 2020-05-25 RX ADMIN — Medication 650 MILLIGRAM(S): at 06:21

## 2020-05-25 RX ADMIN — OXYCODONE HYDROCHLORIDE 10 MILLIGRAM(S): 5 TABLET ORAL at 18:06

## 2020-05-25 RX ADMIN — ONDANSETRON 4 MILLIGRAM(S): 8 TABLET, FILM COATED ORAL at 07:53

## 2020-05-25 RX ADMIN — AZITHROMYCIN 255 MILLIGRAM(S): 500 TABLET, FILM COATED ORAL at 19:20

## 2020-05-25 RX ADMIN — OXYCODONE HYDROCHLORIDE 10 MILLIGRAM(S): 5 TABLET ORAL at 09:37

## 2020-05-25 RX ADMIN — CEFTRIAXONE 100 MILLIGRAM(S): 500 INJECTION, POWDER, FOR SOLUTION INTRAMUSCULAR; INTRAVENOUS at 21:30

## 2020-05-25 RX ADMIN — Medication 650 MILLIGRAM(S): at 18:06

## 2020-05-25 RX ADMIN — Medication 100 MILLIGRAM(S): at 18:06

## 2020-05-25 RX ADMIN — Medication 650 MILLIGRAM(S): at 23:28

## 2020-05-25 RX ADMIN — ONDANSETRON 4 MILLIGRAM(S): 8 TABLET, FILM COATED ORAL at 19:20

## 2020-05-25 RX ADMIN — ENOXAPARIN SODIUM 40 MILLIGRAM(S): 100 INJECTION SUBCUTANEOUS at 06:36

## 2020-05-25 RX ADMIN — Medication 30 MILLIGRAM(S): at 18:06

## 2020-05-25 RX ADMIN — FLUCONAZOLE 150 MILLIGRAM(S): 150 TABLET ORAL at 21:30

## 2020-05-25 RX ADMIN — Medication 30 MILLIGRAM(S): at 06:22

## 2020-05-25 RX ADMIN — Medication 650 MILLIGRAM(S): at 12:01

## 2020-05-25 RX ADMIN — Medication 100 MILLIGRAM(S): at 06:21

## 2020-05-25 NOTE — PROGRESS NOTE ADULT - ASSESSMENT
19 yo F with hx of anxiety and depression, p/w acute hypoxic respiratory failure and diarrhea associated with leukocytosis and fever, diffuse GGO on CT chest, concerning for EVALI in setting of multiple negative COVID swabs

## 2020-05-25 NOTE — PROGRESS NOTE ADULT - SUBJECTIVE AND OBJECTIVE BOX
INTERVAL HPI/OVERNIGHT EVENTS:    Patient was seen and examined at bedside. Diarrhea has resolved for now; she credits this with change in diet to a bland diet.  Her SOB is slightly improved but she still reports severe chest pain and cough.  Fevers have resolved after starting prednisone     CONSTITUTIONAL:  Negative fever or chills, feels weak, poor appetite  EYES:  Negative  blurry vision or double vision  CARDIOVASCULAR:  Negative for chest pain or palpitations  RESPIRATORY:  + cough, wheezing, + SOB   GASTROINTESTINAL:  Negative for nausea, vomiting, diarrhea, constipation, or abdominal pain  GENITOURINARY:  Negative frequency, urgency or dysuria  NEUROLOGIC:  No headache, confusion, dizziness, lightheadedness      ANTIBIOTICS/RELEVANT:    MEDICATIONS  (STANDING):  acetaminophen   Tablet .. 650 milliGRAM(s) Oral every 6 hours  ALBUTerol    90 MICROgram(s) HFA Inhaler 1 Puff(s) Inhalation every 6 hours  azithromycin  IVPB 500 milliGRAM(s) IV Intermittent every 24 hours  cefTRIAXone   IVPB 2000 milliGRAM(s) IV Intermittent every 24 hours  doxycycline hyclate Capsule 100 milliGRAM(s) Oral every 12 hours  enoxaparin Injectable 40 milliGRAM(s) SubCutaneous every 24 hours  melatonin 5 milliGRAM(s) Oral at bedtime  methylPREDNISolone sodium succinate Injectable 30 milliGRAM(s) IV Push two times a day  ondansetron   Disintegrating Tablet 4 milliGRAM(s) Oral once  oxyCODONE  ER Tablet 10 milliGRAM(s) Oral every 12 hours    MEDICATIONS  (PRN):  clonazePAM  Tablet 1 milliGRAM(s) Oral daily PRN anxiety  hydrocodone/homatropine Syrup 5 milliLiter(s) Oral every 6 hours PRN Cough  LORazepam     Tablet 0.5 milliGRAM(s) Oral two times a day PRN Anxiety  promethazine 25 milliGRAM(s) Oral every 6 hours PRN nausea and vomiting        Vital Signs Last 24 Hrs  T(C): 36.6 (25 May 2020 14:14), Max: 38 (24 May 2020 15:00)  T(F): 97.8 (25 May 2020 14:14), Max: 100.4 (24 May 2020 15:00)  HR: 73 (25 May 2020 14:14) (50 - 115)  BP: 118/69 (25 May 2020 14:14) (98/52 - 134/86)  BP(mean): 89 (25 May 2020 14:14) (69 - 106)  RR: 36 (25 May 2020 14:14) (18 - 47)  SpO2: 96% (25 May 2020 14:14) (84% - 100%)    PHYSICAL EXAM:  Constitutional:  uncomfortable, ill appearing  Eyes: no icterus   Ear/Nose/Throat: no oral lesion  Neck:  supple  Respiratory: + crackles  Cardiovascular: tachycardic  Gastrointestinal:soft, (+) BS, no HSM  Extremities:no edema  Vascular: DP Pulse:	right normal; left normal      LABS:                        12.6   29.77 )-----------( 447      ( 25 May 2020 06:14 )             38.1     05-25    138  |  97  |  6<L>  ----------------------------<  124<H>  4.2   |  28  |  0.45<L>    Ca    9.0      25 May 2020 06:14  Phos  3.9     05-25  Mg     2.4     05-25    TPro  6.5  /  Alb  2.8<L>  /  TBili  0.2  /  DBili  x   /  AST  29  /  ALT  8<L>  /  AlkPhos  102  05-25    PT/INR - ( 25 May 2020 06:14 )   PT: 21.7 sec;   INR: 1.87          PTT - ( 25 May 2020 06:14 )  PTT:33.3 sec    Procalcitonin, Serum (05.23.20 @ 06:26)    Procalcitonin, Serum: 1.20: Procalcitonin (PCT) Interpretation (ng/mL) - Diagnosis of systemic  bacterial infection/sepsis  PCT < 0.5: Systemic infection (sepsis) is not likely and risk for  progression to severe systemic infection is low. Local bacterial  infection is possible. If early sepsis is suspected clinically, PCT  should be re-assessed in 6-24 hours.  PCT >/= 0.5 but < 2.0: Systemic infection (sepsis) is possible, but other  conditions are known to elevate PCT as well. Moderate risk for  progression to severe systemic infection. The patient should be closely  monitored both clinically and by re-assessing PCT within 6-24 hours.  PCT >/= 2.0 but < 10.0: Systemic infection (sepsis) is likely, unless  other causes are known. High risk of progression to severe systemic  infection (severe sepsis/septic shock).  PCT >/= 10.0: Important systemic inflammatory response, almost  exclusively due to severe bacterial sepsis or septic shock. High  likelihood of severe sepsis or septic shock. ng/mL        MICROBIOLOGY:    Culture - Blood (05.24.20 @ 08:27)    Specimen Source: .Blood Blood    Culture Results:   No growth at 1 day.    Culture - Blood (05.24.20 @ 08:27)    Specimen Source: .Blood Blood    Culture Results:   No growth at 1 day.    Culture - Urine (05.23.20 @ 08:45)    Specimen Source: .Urine Clean Catch (Midstream)    Culture Results:   No growth        RADIOLOGY & ADDITIONAL STUDIES:    < from: Xray Chest 1 View- PORTABLE-Urgent (05.23.20 @ 23:49) >    Portable examination of the chest demonstrates progression lung infiltrates in comparison to prior examination of the chest 5/23/2020. The heart is within normal limits in transverse diameter. Visualized osseous structures are within normal limits.    Impression: Progression bilateral infiltrates    < end of copied text >

## 2020-05-25 NOTE — PROGRESS NOTE ADULT - SUBJECTIVE AND OBJECTIVE BOX
Interval Events: Started on steroids and HFNC for increased work of breathing    Patient seen and examined at bedside. She is tearful and anxious - states that her breathing overall feels a bit better, but her chest pain when she coughs or takes a deep breath is severe and limits her breathing. Has frequent panic attacks prior to hospitalization that require klonopin and feels her anxiety is worsening. Cough with sputum and small streaks of blood noted. Fever improved. Diarrhea slightly improved with change in diet.        MEDICATIONS:  Pulmonary:  ALBUTerol    90 MICROgram(s) HFA Inhaler 1 Puff(s) Inhalation every 6 hours  hydrocodone/homatropine Syrup 5 milliLiter(s) Oral every 6 hours PRN    Antimicrobials:  azithromycin  IVPB 500 milliGRAM(s) IV Intermittent every 24 hours  cefTRIAXone   IVPB 2000 milliGRAM(s) IV Intermittent every 24 hours  doxycycline hyclate Capsule 100 milliGRAM(s) Oral every 12 hours    Anticoagulants:  enoxaparin Injectable 40 milliGRAM(s) SubCutaneous every 24 hours    Cardiac:      Allergies    No Known Allergies    Intolerances        Vital Signs Last 24 Hrs  T(C): 36.9 (25 May 2020 10:00), Max: 39 (24 May 2020 12:00)  T(F): 98.5 (25 May 2020 10:00), Max: 102.2 (24 May 2020 12:00)  HR: 85 (25 May 2020 09:30) (50 - 115)  BP: 122/75 (25 May 2020 09:00) (98/52 - 134/86)  BP(mean): 83 (25 May 2020 08:00) (69 - 106)  RR: 35 (25 May 2020 09:35) (18 - 47)  SpO2: 97% (25 May 2020 09:35) (84% - 100%)    05-24 @ 07:01  -  05-25 @ 07:00  --------------------------------------------------------  IN: 240 mL / OUT: 2400 mL / NET: -2160 mL          PHYSICAL EXAM:    General: Uncomfortable, tearful, short shallow breaths noted with mild exertion; comfortable at rest  Respiratory: Clear to auscultation bilaterally without wheezing, rhonchi or rales  Cardiovascular: Regular rate and rhythm. S1 and S2 Normal; No murmurs, gallops or rubs  Gastrointestinal: Soft, mild epigastric tenderness, non-distended; Normal bowel sounds  Extremities: Normal range of motion, No clubbing, cyanosis or edema  Neurological: Alert and oriented x3  Skin: Warm and dry. No obvious rash    LABS:  ABG - ( 23 May 2020 23:00 )  pH, Arterial: 7.43  pH, Blood: x     /  pCO2: 42    /  pO2: 61    / HCO3: 28    / Base Excess: 3.2   /  SaO2: 93                  CBC Full  -  ( 25 May 2020 06:14 )  WBC Count : 29.77 K/uL  RBC Count : 4.12 M/uL  Hemoglobin : 12.6 g/dL  Hematocrit : 38.1 %  Platelet Count - Automated : 447 K/uL  Mean Cell Volume : 92.5 fl  Mean Cell Hemoglobin : 30.6 pg  Mean Cell Hemoglobin Concentration : 33.1 gm/dL  Auto Neutrophil # : 27.70 K/uL  Auto Lymphocyte # : 1.28 K/uL  Auto Monocyte # : 0.43 K/uL  Auto Eosinophil # : 0.00 K/uL  Auto Basophil # : 0.05 K/uL  Auto Neutrophil % : 93.1 %  Auto Lymphocyte % : 4.3 %  Auto Monocyte % : 1.4 %  Auto Eosinophil % : 0.0 %  Auto Basophil % : 0.2 %    05-25    138  |  97  |  6<L>  ----------------------------<  124<H>  4.2   |  28  |  0.45<L>    Ca    9.0      25 May 2020 06:14  Phos  3.9     05-25  Mg     2.4     05-25    TPro  6.5  /  Alb  2.8<L>  /  TBili  0.2  /  DBili  x   /  AST  29  /  ALT  8<L>  /  AlkPhos  102  05-25    PT/INR - ( 25 May 2020 06:14 )   PT: 21.7 sec;   INR: 1.87          PTT - ( 25 May 2020 06:14 )  PTT:33.3 sec                  RADIOLOGY & ADDITIONAL STUDIES (The following images were personally reviewed):  < from: CT Angio Chest PE Protocol w/ IV Cont (05.22.20 @ 14:57) >    IMPRESSION:   1. No evidence of pulmonary embolism.    2. Worsening of now extensive groundglass opacity throughout both lungs compared to chest x-ray of 5/21/2020. The pattern of groundglassopacity suggest infection, including atypical pneumonia/viral infection from atypical agents including COVID 19.    3. Some of the groundglass opacity could be related to edema/fluid overload, as there is also new interlobular septal thickening bilaterally and there are small bilateral pleural effusions.    4. Small ascites.    5. There is a 3.2 cm left ovarian cyst. It is likely a functional cyst.    < end of copied text >

## 2020-05-25 NOTE — PROGRESS NOTE ADULT - SUBJECTIVE AND OBJECTIVE BOX
OVERNIGHT EVENTS: FiO2 @ 40% overnight, still with coughing fits although afebrile overnight with no diarrhea, CT scan with no evidence of colitis.    SUBJECTIVE / INTERVAL HPI: Patient seen and examined at bedside continues to endorse generalized discomfort with continued CP with deep inspiration and abdominal soreness.     Review of systems negative except as noted above.     VITAL SIGNS:  Vital Signs Last 24 Hrs  T(C): 36.9 (25 May 2020 06:00), Max: 39 (24 May 2020 12:00)  T(F): 98.5 (25 May 2020 06:00), Max: 102.2 (24 May 2020 12:00)  HR: 83 (25 May 2020 08:00) (50 - 115)  BP: 112/64 (25 May 2020 08:00) (98/52 - 134/86)  BP(mean): 83 (25 May 2020 08:00) (69 - 106)  RR: 44 (25 May 2020 08:00) (18 - 47)  SpO2: 95% (25 May 2020 08:00) (84% - 100%)      05-24-20 @ 07:01  -  05-25-20 @ 07:00  --------------------------------------------------------  IN: 240 mL / OUT: 2400 mL / NET: -2160 mL        PHYSICAL EXAM:    General: Visibly uncomfortable. on HFNC with no accessory muscle use although tachypnic.  HEENT: PERRLA, MMM  Neck: -JVD  Cardiovascular: +S1/S2; RRR. Not tachycardic  Respiratory: Limited inspiration  Gastrointestinal: NTND BS+4  Extremities: WWP; no edema  Vascular: 2+ radial, DP pulses B/L  Neurological: AAOx3; no focal deficits    MEDICATIONS:  MEDICATIONS  (STANDING):  acetaminophen   Tablet .. 650 milliGRAM(s) Oral every 6 hours  ALBUTerol    90 MICROgram(s) HFA Inhaler 1 Puff(s) Inhalation every 6 hours  azithromycin  IVPB 500 milliGRAM(s) IV Intermittent every 24 hours  cefTRIAXone   IVPB 2000 milliGRAM(s) IV Intermittent every 24 hours  doxycycline hyclate Capsule 100 milliGRAM(s) Oral every 12 hours  enoxaparin Injectable 40 milliGRAM(s) SubCutaneous every 24 hours  melatonin 5 milliGRAM(s) Oral at bedtime  methylPREDNISolone sodium succinate Injectable 30 milliGRAM(s) IV Push two times a day  ondansetron   Disintegrating Tablet 4 milliGRAM(s) Oral once    MEDICATIONS  (PRN):  clonazePAM  Tablet 1 milliGRAM(s) Oral daily PRN anxiety  hydrocodone/homatropine Syrup 5 milliLiter(s) Oral every 6 hours PRN Cough  LORazepam     Tablet 0.5 milliGRAM(s) Oral two times a day PRN Anxiety  promethazine 25 milliGRAM(s) Oral every 6 hours PRN nausea and vomiting      ALLERGIES:  Allergies    No Known Allergies    Intolerances        LABS:                        12.6   29.77 )-----------( 447      ( 25 May 2020 06:14 )             38.1     05-25    138  |  97  |  6<L>  ----------------------------<  124<H>  4.2   |  28  |  0.45<L>    Ca    9.0      25 May 2020 06:14  Phos  3.9     05-25  Mg     2.4     05-25    TPro  6.5  /  Alb  2.8<L>  /  TBili  0.2  /  DBili  x   /  AST  29  /  ALT  8<L>  /  AlkPhos  102  05-25    PT/INR - ( 25 May 2020 06:14 )   PT: 21.7 sec;   INR: 1.87          PTT - ( 25 May 2020 06:14 )  PTT:33.3 sec    CAPILLARY BLOOD GLUCOSE      POCT Blood Glucose.: 103 mg/dL (23 May 2020 23:15)    CARDIAC MARKERS ( 24 May 2020 01:55 )  x     / <0.01 ng/mL / x     / x     / x            RADIOLOGY & ADDITIONAL TESTS: Reviewed.

## 2020-05-25 NOTE — PROGRESS NOTE ADULT - ASSESSMENT
Pt is a 20F PMH anxiety and depression presenting on 5/21 with fever, cough, chills, running nose, nausea, diarrhea, abd pain x3 days. Possibly 2/2 COVID-19 (negative) and/or bacterial process (CAP) with sepsis. Initially admitted to [facility], transferred to  for possible  bronchoscopy. Now COVID PCR swab negative x2, transferred to non COVID ICU on 5/23.     Neuro  - No active issues    Respiratory  #COVID pneumonia vs vaping-associated lung injury (BELGICA)  Pt presents with fever/chills, cough; WBC 18, lymphopenic with ALC 0.87, CT chest with bilateral ground glass opacities. marijuana vape pod use, most recently 3 days prior to admission  -CTA 5/22 neg for PE but atypical for bacterial pneumonia, with worsening b/l ground glass opacities, cannot r/o edema/fluid overload (s/p 2L NS in the ED and fluid gtt for 12hours), If non-infectious, other atypical pathogens (PCP) are also possible. Some of these findings are likely confounded by IV fluid administration though, given the small pleural effusion and septal thickening concurrently seen with the GGO's. Bedside ultrasound also showed thick, fan-like b-lines bilaterally suggestive of pulmonary edema which likely is noncardiogenic. Low suspicion for autoimmune given no systemic manifestations or family history. Given her history of e-cigarette use with marijuana cartridges (uses everyday, last use 2 days ago) she is highly suspicious for EVALI.   - overnight patient tachycardic and tachypneic requiring HFNC, chest x-ray with slightly worsening right-sided effusion  - c/w solumedrol BID--> patient afebrile,  and breathing looks less labored than previous days.   - If her clinical status becomes worse including oxygenation and/or radiographically, then will likely need a bronchoscopy with TBBx and BAL to r/o infx including atypicals such as PCP.   - f/u HIV test and Autoimmune panel (TIFFANY, RF, CCP, Sjogren's,)  - obtain echo given ?pulmonary edema   - COVID swab PCR neg x2 and antibody negative  - Currently saturating 100% on HFNC at 30%, although desats to high 80s/low 90s with coughing on room air  - Hycodan, tessalon perle for cough suppression    #Bacterial pneumonia  Pt febrile to 103.3F, tachycardic to 131, WBC 20 (3/4 SIRS criteria). CXR with LLL consolidative process  - MRSA swab negative  - blood cx NGTD   - f/u urine legionella negative  - c/w CTX/Azithro started on 5/22, and doxycyline 100mg BID as per ID Given recent outdoor exposure  - f/u send tick-borne disease antibodies, EHRLICHIA/ANAPLASMA PCR on serum.     ID  - Management as above    Cardiovascular  - No active issues    GI  #Diarrhea  - Pt reports four days of loose stools, non-bloody with no mucus--> With no diarrhea overnight. Will continue to monitor-if with resumption of diarhhea will send c.diff as patient on multiple abx- although with benign GI exam.   - GI PCR negative  - f/u stool culture  - Continue to monitor    #Nausea  - Promethazine for nausea/vomiting    #Nutrition  - Pt unable to tolerate much PO  - Clear liquid diet    Renal  - BUN/Cr 3/0.58    Heme  - none    Psych  #Anxiety  Pt on klonopin 1mg daily PRN and is noncompliant with lexapro 20mg daily  - Continue klonopin 1mg daily PRN    Endocrine  - No active issues    F: None  E: Replete PRN for K<4, Mg<2  N: Clear liquid  DVT PPx: LSQ DVT ppx   GI PPx: None  Dispo: NONCOVID MICU Pt is a 20F PMH anxiety and depression presenting on 5/21 with fever, cough, chills, running nose, nausea, diarrhea, abd pain x3 days. Possibly 2/2 COVID-19 (negative) and/or bacterial process (CAP) with sepsis. Initially admitted to [facility], transferred to  for possible  bronchoscopy. Now COVID PCR swab negative x2, transferred to non COVID ICU on 5/23.     Neuro  - No active issues    Respiratory  #COVID pneumonia vs vaping-associated lung injury (BELGICA)  Pt presents with fever/chills, cough; WBC 18, lymphopenic with ALC 0.87, CT chest with bilateral ground glass opacities. marijuana vape pod use, most recently 3 days prior to admission  -CTA 5/22 neg for PE but atypical for bacterial pneumonia, with worsening b/l ground glass opacities, cannot r/o edema/fluid overload (s/p 2L NS in the ED and fluid gtt for 12hours), If non-infectious, other atypical pathogens (PCP) are also possible. Some of these findings are likely confounded by IV fluid administration though, given the small pleural effusion and septal thickening concurrently seen with the GGO's. Bedside ultrasound also showed thick, fan-like b-lines bilaterally suggestive of pulmonary edema which likely is noncardiogenic. Low suspicion for autoimmune given no systemic manifestations or family history. Given her history of e-cigarette use with marijuana cartridges (uses everyday, last use 2 days ago) she is highly suspicious for EVALI.   - overnight patient tachycardic and tachypneic requiring HFNC, chest x-ray with slightly worsening right-sided effusion  - c/w solumedrol BID--> patient afebrile,  and breathing looks less labored than previous days likely with improvement 2/2 to decreasing fever curve. Up trending WBC count today likely 2/2 solumedrol. although with elevated WBC at baseline due to EVALI.   - If her clinical status becomes worse including oxygenation and/or radiographically, then will likely need a bronchoscopy with TBBx and BAL to r/o infx including atypicals such as PCP.   - f/u HIV test and Autoimmune panel (TIFFANY, RF, CCP, Sjogren's,)  - obtain echo given ?pulmonary edema   - COVID swab PCR neg x2 and antibody negative  - Currently saturating 100% on HFNC at 30%, although desats to high 80s/low 90s with coughing on room air  - Hycodan, tessalon perle for cough suppression    #Bacterial pneumonia  Pt febrile to 103.3F, tachycardic to 131, WBC 20 (3/4 SIRS criteria). CXR with LLL consolidative process  - MRSA swab negative  - blood cx NGTD   - f/u urine legionella negative  - c/w CTX/Azithro started on 5/22, and doxycyline 100mg BID as per ID Given recent outdoor exposure  - f/u send tick-borne disease antibodies, EHRLICHIA/ANAPLASMA PCR on serum.     ID  - Management as above    Cardiovascular  - No active issues    GI  #Diarrhea  - Pt reports four days of loose stools, non-bloody with no mucus--> With no diarrhea overnight. Will continue to monitor-if with resumption of diarhhea will send c.diff as patient on multiple abx- although with benign GI exam.   - GI PCR negative  - f/u stool culture  - Continue to monitor    #Nausea  - Promethazine for nausea/vomiting    #Nutrition  - Pt unable to tolerate much PO  - Clear liquid diet    Renal  - BUN/Cr 3/0.58    Heme  - none    Psych  #Anxiety  Pt on klonopin 1mg daily PRN and is noncompliant with lexapro 20mg daily  - Continue klonopin 1mg daily PRN    Endocrine  - No active issues    F: None  E: Replete PRN for K<4, Mg<2  N: Clear liquid  DVT PPx: LSQ DVT ppx   GI PPx: None  Dispo: NONCOVID MICU Pt is a 20F PMH anxiety and depression presenting on 5/21 with fever, cough, chills, running nose, nausea, diarrhea, abd pain x3 days. Possibly 2/2 COVID-19 (negative) and/or bacterial process (CAP) with sepsis. Initially admitted to [facility], transferred to  for possible  bronchoscopy. Now COVID PCR swab negative x2, transferred to non COVID ICU on 5/23.     Neuro  - No active issues  start oxycodone for pain control    Respiratory  #COVID pneumonia vs vaping-associated lung injury (BELGICA)  Pt presents with fever/chills, cough; WBC 18, lymphopenic with ALC 0.87, CT chest with bilateral ground glass opacities. marijuana vape pod use, most recently 3 days prior to admission  -CTA 5/22 neg for PE but atypical for bacterial pneumonia, with worsening b/l ground glass opacities, cannot r/o edema/fluid overload (s/p 2L NS in the ED and fluid gtt for 12hours), If non-infectious, other atypical pathogens (PCP) are also possible. Some of these findings are likely confounded by IV fluid administration though, given the small pleural effusion and septal thickening concurrently seen with the GGO's. Bedside ultrasound also showed thick, fan-like b-lines bilaterally suggestive of pulmonary edema which likely is noncardiogenic. Low suspicion for autoimmune given no systemic manifestations or family history. Given her history of e-cigarette use with marijuana cartridges (uses everyday, last use 2 days ago) she is highly suspicious for EVALI.   - overnight patient tachycardic and tachypneic requiring HFNC, chest x-ray with slightly worsening right-sided effusion  - c/w solumedrol BID--> patient afebrile,  and breathing looks less labored than previous days likely with improvement 2/2 to decreasing fever curve. Up trending WBC count today likely 2/2 solumedrol. although with elevated WBC at baseline due to EVALI.   - If her clinical status becomes worse including oxygenation and/or radiographically, then will likely need a bronchoscopy with TBBx and BAL to r/o infx including atypicals such as PCP.   - f/u HIV test and Autoimmune panel (TIFFANY, RF, CCP, Sjogren's,)  - obtain echo given ?pulmonary edema   - COVID swab PCR neg x2 and antibody negative  - Currently saturating 100% on HFNC at 30%, although desats to high 80s/low 90s with coughing on room air  - Hycodan, tessalon perle for cough suppression    #Bacterial pneumonia  Pt febrile to 103.3F, tachycardic to 131, WBC 20 (3/4 SIRS criteria). CXR with LLL consolidative process  - MRSA swab negative  - blood cx NGTD   - f/u urine legionella negative  - c/w CTX/Azithro started on 5/22, and doxycyline 100mg BID as per ID Given recent outdoor exposure  - f/u send tick-borne disease antibodies, EHRLICHIA/ANAPLASMA PCR on serum.     ID  - Management as above    Cardiovascular  - No active issues    GI  #Diarrhea  - Pt reports four days of loose stools, non-bloody with no mucus--> With no diarrhea overnight. Will continue to monitor-if with resumption of diarhhea will send c.diff as patient on multiple abx- although with benign GI exam.   - GI PCR negative  - f/u stool culture  - Continue to monitor    #Nausea  - Promethazine for nausea/vomiting    #Nutrition  - Pt unable to tolerate much PO  - Clear liquid diet    Renal  - BUN/Cr 3/0.58    Heme  - none    Psych  #Anxiety  Pt on klonopin 1mg daily PRN and is noncompliant with lexapro 20mg daily  - Continue klonopin 1mg daily PRN    Endocrine  - No active issues    F: None  E: Replete PRN for K<4, Mg<2  N: Clear liquid  DVT PPx: LSQ DVT ppx   GI PPx: None  Dispo: NONCOVID MICU

## 2020-05-25 NOTE — PROGRESS NOTE ADULT - PROBLEM SELECTOR PLAN 1
- Original presentation concerning for COVID given high inflammatory markers, high fevers, and diffuse GGO on CT chest; however, COVID PCR negative and antibody test negative. In setting of extensive vaping history and lower lobe predominance of GGO, clinical picture also consistent with EVALI  - Started on CAP coverage (cef/azithro) + doxy per ID recs; solumedrol 1mg/kg initiated yesterday with HFNC in setting of worsening SOB and progression of infiltrates  - Since then, slight interval improvement in CXR this AM, afebrile since yesterday evening (longest period without fever) and pt with subjective improvement in SOB at rest, but continues to have splinting 2/2 severe pleuritic pain, also with significant hx of anxiety    Recommendations  - C/w abx for CAP given increasing procalcitonin   - C/w IV solumedrol 1mg/kg for presumed EVALI at this time  - C/w HFNC and would increase pain medication to standing regimen with PRN breakthrough to prevent splinting which can contribute to hypoxia. Would also resume home klonopin dosing given hx of panic attacks  - No interval worsening at this time since initiation of steroids; would still make pt NPO after midnight for possible bronchoscopy with BAL and TBBx pending clinical status tomorrow morning. Will evaluate in AM re: utility of bronchoscopy  - Once pain is controlled, recommend OOBTC and incentive spirometry.

## 2020-05-25 NOTE — PROGRESS NOTE ADULT - ASSESSMENT
IMPRESSION:  Febrile illness --> concern for COVID-19 infection however both PCR and antibodies are negative making this less likely.  Community-acquired pneumonia is also on the differential.  She is being well-covered for the typical CAP pathogens with Ceftriaxone and Azithromycin but she has not responded at this time.     She has no risk factors for MRSA, pseudomonas aeruginosa and ESBL Enterobactericae    In her history the only recent significant epidemiological exposure was hiking in [place] which puts her at risk for tick borne illness; however this would not explain her respiratory symptoms     If diarrhea returns and leukocytosis persists, can consider checking for C. Diff  Diarrhea may be due to pneumonia    Fevers have resolved after starting steroids     Recommend:  1.  Can continue Ceftriaxone and Azithromycin for pending further work up  2.  Continue Doxycycline 100 mg PO q12.  If tick serologies and Ehrlichia/Anaplasma PCR negative, this can be stopped  3.  If patient clinically declines, I have a low threshold to stop Ceftriaxone and start Linezolid/Zosyn for broader coverage   4  Check stool for C. Diff if diarrhea returns   5.  Will recommend repeat COVID-19 IgG in one week   6.  Follow up strep pneumoniae antigen  7.  Follow up fungitel (low suspicion for PCP given no evidence of immune suppression)    ID team 1 will follow

## 2020-05-26 LAB
ALBUMIN SERPL ELPH-MCNC: 2.9 G/DL — LOW (ref 3.3–5)
ALP SERPL-CCNC: 83 U/L — SIGNIFICANT CHANGE UP (ref 40–120)
ALT FLD-CCNC: 16 U/L — SIGNIFICANT CHANGE UP (ref 10–45)
ANA TITR SER: NEGATIVE — SIGNIFICANT CHANGE UP
ANION GAP SERPL CALC-SCNC: 13 MMOL/L — SIGNIFICANT CHANGE UP (ref 5–17)
APTT BLD: 29.4 SEC — SIGNIFICANT CHANGE UP (ref 27.5–36.3)
AST SERPL-CCNC: 34 U/L — SIGNIFICANT CHANGE UP (ref 10–40)
BASOPHILS # BLD AUTO: 0.04 K/UL — SIGNIFICANT CHANGE UP (ref 0–0.2)
BASOPHILS NFR BLD AUTO: 0.1 % — SIGNIFICANT CHANGE UP (ref 0–2)
BILIRUB SERPL-MCNC: 0.2 MG/DL — SIGNIFICANT CHANGE UP (ref 0.2–1.2)
BUN SERPL-MCNC: 8 MG/DL — SIGNIFICANT CHANGE UP (ref 7–23)
CALCIUM SERPL-MCNC: 8.8 MG/DL — SIGNIFICANT CHANGE UP (ref 8.4–10.5)
CCP IGG SERPL-ACNC: <8 UNITS — SIGNIFICANT CHANGE UP
CHLORIDE SERPL-SCNC: 97 MMOL/L — SIGNIFICANT CHANGE UP (ref 96–108)
CO2 SERPL-SCNC: 26 MMOL/L — SIGNIFICANT CHANGE UP (ref 22–31)
CREAT SERPL-MCNC: 0.46 MG/DL — LOW (ref 0.5–1.3)
CULTURE RESULTS: SIGNIFICANT CHANGE UP
CULTURE RESULTS: SIGNIFICANT CHANGE UP
DSDNA AB FLD-ACNC: <0.2 AI — SIGNIFICANT CHANGE UP
ENA SS-A AB FLD IA-ACNC: <0.2 AI — SIGNIFICANT CHANGE UP
EOSINOPHIL # BLD AUTO: 0.01 K/UL — SIGNIFICANT CHANGE UP (ref 0–0.5)
EOSINOPHIL NFR BLD AUTO: 0 % — SIGNIFICANT CHANGE UP (ref 0–6)
GLUCOSE SERPL-MCNC: 140 MG/DL — HIGH (ref 70–99)
HCT VFR BLD CALC: 35.1 % — SIGNIFICANT CHANGE UP (ref 34.5–45)
HGB BLD-MCNC: 11.8 G/DL — SIGNIFICANT CHANGE UP (ref 11.5–15.5)
IMM GRANULOCYTES NFR BLD AUTO: 0.7 % — SIGNIFICANT CHANGE UP (ref 0–1.5)
INR BLD: 1.57 — HIGH (ref 0.88–1.16)
LYMPHOCYTES # BLD AUTO: 1.22 K/UL — SIGNIFICANT CHANGE UP (ref 1–3.3)
LYMPHOCYTES # BLD AUTO: 4.2 % — LOW (ref 13–44)
MAGNESIUM SERPL-MCNC: 2.7 MG/DL — HIGH (ref 1.6–2.6)
MCHC RBC-ENTMCNC: 30.7 PG — SIGNIFICANT CHANGE UP (ref 27–34)
MCHC RBC-ENTMCNC: 33.6 GM/DL — SIGNIFICANT CHANGE UP (ref 32–36)
MCV RBC AUTO: 91.4 FL — SIGNIFICANT CHANGE UP (ref 80–100)
MONOCYTES # BLD AUTO: 0.61 K/UL — SIGNIFICANT CHANGE UP (ref 0–0.9)
MONOCYTES NFR BLD AUTO: 2.1 % — SIGNIFICANT CHANGE UP (ref 2–14)
NEUTROPHILS # BLD AUTO: 27.11 K/UL — HIGH (ref 1.8–7.4)
NEUTROPHILS NFR BLD AUTO: 92.9 % — HIGH (ref 43–77)
NRBC # BLD: 0 /100 WBCS — SIGNIFICANT CHANGE UP (ref 0–0)
PHOSPHATE SERPL-MCNC: 3.4 MG/DL — SIGNIFICANT CHANGE UP (ref 2.5–4.5)
PLATELET # BLD AUTO: 489 K/UL — HIGH (ref 150–400)
POTASSIUM SERPL-MCNC: 4.2 MMOL/L — SIGNIFICANT CHANGE UP (ref 3.5–5.3)
POTASSIUM SERPL-SCNC: 4.2 MMOL/L — SIGNIFICANT CHANGE UP (ref 3.5–5.3)
PROT SERPL-MCNC: 6.2 G/DL — SIGNIFICANT CHANGE UP (ref 6–8.3)
PROTHROM AB SERPL-ACNC: 18.2 SEC — HIGH (ref 10–12.9)
RBC # BLD: 3.84 M/UL — SIGNIFICANT CHANGE UP (ref 3.8–5.2)
RBC # FLD: 12.7 % — SIGNIFICANT CHANGE UP (ref 10.3–14.5)
RF+CCP IGG SER-IMP: NEGATIVE — SIGNIFICANT CHANGE UP
SODIUM SERPL-SCNC: 136 MMOL/L — SIGNIFICANT CHANGE UP (ref 135–145)
SPECIMEN SOURCE: SIGNIFICANT CHANGE UP
SPECIMEN SOURCE: SIGNIFICANT CHANGE UP
WBC # BLD: 29.2 K/UL — HIGH (ref 3.8–10.5)
WBC # FLD AUTO: 29.2 K/UL — HIGH (ref 3.8–10.5)

## 2020-05-26 PROCEDURE — 99291 CRITICAL CARE FIRST HOUR: CPT

## 2020-05-26 PROCEDURE — 99232 SBSQ HOSP IP/OBS MODERATE 35: CPT

## 2020-05-26 PROCEDURE — 71045 X-RAY EXAM CHEST 1 VIEW: CPT | Mod: 26

## 2020-05-26 PROCEDURE — 93306 TTE W/DOPPLER COMPLETE: CPT | Mod: 26

## 2020-05-26 RX ORDER — CEFTRIAXONE 500 MG/1
2000 INJECTION, POWDER, FOR SOLUTION INTRAMUSCULAR; INTRAVENOUS ONCE
Refills: 0 | Status: COMPLETED | OUTPATIENT
Start: 2020-05-26 | End: 2020-05-26

## 2020-05-26 RX ADMIN — Medication 100 MILLIGRAM(S): at 06:48

## 2020-05-26 RX ADMIN — CEFTRIAXONE 100 MILLIGRAM(S): 500 INJECTION, POWDER, FOR SOLUTION INTRAMUSCULAR; INTRAVENOUS at 21:33

## 2020-05-26 RX ADMIN — Medication 30 MILLIGRAM(S): at 17:41

## 2020-05-26 RX ADMIN — Medication 100 MILLIGRAM(S): at 17:40

## 2020-05-26 RX ADMIN — Medication 650 MILLIGRAM(S): at 17:40

## 2020-05-26 RX ADMIN — ENOXAPARIN SODIUM 40 MILLIGRAM(S): 100 INJECTION SUBCUTANEOUS at 06:48

## 2020-05-26 RX ADMIN — Medication 30 MILLIGRAM(S): at 06:48

## 2020-05-26 RX ADMIN — OXYCODONE HYDROCHLORIDE 10 MILLIGRAM(S): 5 TABLET ORAL at 17:41

## 2020-05-26 RX ADMIN — Medication 25 MILLIGRAM(S): at 12:58

## 2020-05-26 NOTE — PROGRESS NOTE ADULT - ASSESSMENT
IMPRESSION:  Febrile illness --> concern for COVID-19 infection however both PCR and antibodies are negative making this less likely.  Community-acquired pneumonia is also on the differential.  She is being well-covered for the typical CAP pathogens with Ceftriaxone and Azithromycin but she has not responded at this time.  Infectious diseases work up is negative.  She has responded to steroids which makes vaping lung injury more likely     She has no risk factors for MRSA, pseudomonas aeruginosa and ESBL Enterobactericae    In her history the only recent significant epidemiological exposure was hiking in [place] which puts her at risk for tick borne illness; however this would not explain her respiratory symptoms     Recommend:  1.  Can continue Ceftriaxone (complete 7 day course)  2.  Can stop Azithromycin at this time  3.  Continue Doxycycline 100 mg PO q12.  If tick serologies and Ehrlichia/Anaplasma PCR negative, this can be stopped  4.  Follow up fungitel (low suspicion for PCP given no evidence of immune suppression)    ID team 1 will follow

## 2020-05-26 NOTE — PROGRESS NOTE ADULT - ASSESSMENT
Pt is a 20F PMH anxiety and depression presenting on 5/21 with fever, cough, chills, running nose, nausea, diarrhea, abd pain x3 days. Possibly 2/2 COVID-19 (negative) and/or bacterial process (CAP) with sepsis. Initially admitted to [facility], transferred to  for possible  bronchoscopy. Now COVID PCR swab negative x2, transferred to non COVID ICU on 5/23.     Neuro  - No active issues  start oxycodone for pain control    Respiratory  #Electronic vaping-associated lung injury (EVALI)  Pt presents with fever/chills, cough; WBC 18, lymphopenic with ALC 0.87, CT chest with bilateral ground glass opacities. marijuana vape pod use, most recently 3 days prior to admission  -CTA 5/22 neg for PE but atypical for bacterial pneumonia, with worsening b/l ground glass opacities, cannot r/o edema/fluid overload (s/p 2L NS in the ED and fluid gtt for 12hours), If non-infectious, other atypical pathogens (PCP) are also possible. Some of these findings are likely confounded by IV fluid administration though, given the small pleural effusion and septal thickening concurrently seen with the GGO's. Bedside ultrasound also showed thick, fan-like b-lines bilaterally suggestive of pulmonary edema which likely is noncardiogenic. Low suspicion for autoimmune given no systemic manifestations or family history. Given her history of e-cigarette use with marijuana cartridges (uses everyday, last use 2 days ago)    - overnight patient tachycardic and tachypneic requiring HFNC, chest x-ray with slightly worsening right-sided effusion  - Patient showing consistent improvement with solumedrol so will c/w solumedrol BID--> patient afebrile,  and breathing looks less labored than previous days likely with improvement 2/2 to decreasing fever curve.   - If clinical status becomes worse including oxygenation and/or radiographically, then will likely need a bronchoscopy with TBBx and BAL to r/o infx including atypicals such as PCP.   - f/u HIV test and Autoimmune panel (TIFFANY, RF, CCP, Sjogren's,)  - obtain echo given ?pulmonary edema-ECHO as above  - COVID swab PCR neg x2 and antibody negative  - Wean O2 today to NC  - Hycodan, tessalon perle for cough suppression    #Bacterial pneumonia  Pt febrile to 103.3F, tachycardic to 131, WBC 20 (3/4 SIRS criteria). CXR with LLL consolidative process  - MRSA swab negative  - blood cx NGTD   - f/u urine legionella negative  - c/w CTX/Azithro started on 5/22, and doxycyline 100mg BID as per ID Given recent outdoor exposure  - f/u send tick-borne disease antibodies, EHRLICHIA/ANAPLASMA PCR on serum.       ID  - Management as above    Cardiovascular  - No active issues    GI  #Diarrhea  Multiple documented cases of EVALI presenting with GI sxs. Given diarrhea prior to onset of abx, low suspicion for other infectious causes. Now further work up at this time.  - Pt reports four days of loose stools, non-bloody with no mucus--> With no diarrhea overnight. Will continue to monitor-if with resumption of diarhhea will send c.diff as patient on multiple abx- although with benign GI exam.   - GI PCR negative  - f/u stool culture  - Continue to monitor    #Nausea  - Promethazine for nausea/vomiting    #Nutrition  - Pt unable to tolerate much PO  - Clear liquid diet    Renal  - BUN/Cr 3/0.58    Heme  - none    Psych  #Anxiety  Pt on klonopin 1mg daily PRN and is noncompliant with lexapro 20mg daily  - Continue klonopin 1mg daily PRN    Endocrine  - No active issues    F: None  E: Replete PRN for K<4, Mg<2  N: Clear liquid  DVT PPx: LSQ DVT ppx   GI PPx: None  Dispo: NONCOVID MICU

## 2020-05-26 NOTE — PROGRESS NOTE ADULT - ASSESSMENT
21 yo F with hx of anxiety and depression, p/w acute hypoxic respiratory failure and diarrhea associated with leukocytosis and fever, diffuse GGO on CT chest, concerning for EVALI in setting of multiple negative COVID swabs

## 2020-05-26 NOTE — PROGRESS NOTE ADULT - SUBJECTIVE AND OBJECTIVE BOX
OVERNIGHT EVENTS: Patient given one dose of PO diflucan overnight for complaints consistent with yeast infection.     SUBJECTIVE / INTERVAL HPI: Patient seen and examined at bedside. Continues to endorse shortness of breath, pleuritic cp, and loose stools. But overall better.     Review of systems negative except as noted above.     VITAL SIGNS:  Vital Signs Last 24 Hrs  T(C): 36.8 (26 May 2020 09:00), Max: 37.3 (25 May 2020 17:45)  T(F): 98.3 (26 May 2020 09:00), Max: 99.1 (25 May 2020 17:45)  HR: 66 (26 May 2020 09:53) (46 - 89)  BP: 120/72 (26 May 2020 09:00) (100/56 - 123/77)  BP(mean): 91 (26 May 2020 09:00) (75 - 95)  RR: 28 (26 May 2020 09:53) (12 - 44)  SpO2: 98% (26 May 2020 09:53) (91% - 100%)      05-25-20 @ 07:01  -  05-26-20 @ 07:00  --------------------------------------------------------  IN: 610 mL / OUT: 600 mL / NET: 10 mL        PHYSICAL EXAM:    General: Visibly improved today, with less tachypnea.   HEENT: MMM  Neck: -JVD  Cardiovascular: +S1/S2; RRR  Respiratory: CTA B/L; no W/R/R. Speaking in full sentences, tachypnea improved while on HFNC.  Gastrointestinal: NTND BS+4  Extremities: WWP; no edema  Vascular: 2+ radial, DP pulses B/L  Neurological: AAOx3; no focal deficits    MEDICATIONS:  MEDICATIONS  (STANDING):  acetaminophen   Tablet .. 650 milliGRAM(s) Oral every 6 hours  ALBUTerol    90 MICROgram(s) HFA Inhaler 1 Puff(s) Inhalation every 6 hours  cefTRIAXone   IVPB 2000 milliGRAM(s) IV Intermittent once  doxycycline hyclate Capsule 100 milliGRAM(s) Oral every 12 hours  enoxaparin Injectable 40 milliGRAM(s) SubCutaneous every 24 hours  methylPREDNISolone sodium succinate Injectable 30 milliGRAM(s) IV Push two times a day  ondansetron   Disintegrating Tablet 4 milliGRAM(s) Oral once  oxyCODONE  ER Tablet 10 milliGRAM(s) Oral every 12 hours    MEDICATIONS  (PRN):  clonazePAM  Tablet 1 milliGRAM(s) Oral daily PRN anxiety  hydrocodone/homatropine Syrup 5 milliLiter(s) Oral every 4 hours PRN Cough  promethazine 25 milliGRAM(s) Oral every 6 hours PRN nausea and vomiting      ALLERGIES:  Allergies    No Known Allergies    Intolerances        LABS:                        11.8   29.20 )-----------( 489      ( 26 May 2020 06:27 )             35.1     05-26    136  |  97  |  8   ----------------------------<  140<H>  4.2   |  26  |  0.46<L>    Ca    8.8      26 May 2020 06:28  Phos  3.4     05-26  Mg     2.7     05-26    TPro  6.2  /  Alb  2.9<L>  /  TBili  0.2  /  DBili  x   /  AST  34  /  ALT  16  /  AlkPhos  83  05-26    PT/INR - ( 26 May 2020 06:27 )   PT: 18.2 sec;   INR: 1.57          PTT - ( 26 May 2020 06:27 )  PTT:29.4 sec    CAPILLARY BLOOD GLUCOSE            < from: TTE Echo Complete w/o contrast w/ Doppler (05.26.20 @ 09:47) >  CONCLUSIONS:     1. Normal left and right ventricular size and systolic function.   2. Mild mitral regurgitation.   3. Mild tricuspid regurgitation.   4. No evidence of pulmonary hypertension, pulmonary artery systolic pressure is 28 mmHg.   5. No pericardial effusion.    < end of copied text >

## 2020-05-26 NOTE — PROGRESS NOTE ADULT - SUBJECTIVE AND OBJECTIVE BOX
Interval Events:  Patient seen and examined at bedside. Definite improvement in SOB and anxiety today - states yesterday her pleuritic pain was as severe as 7/10, now down to 2-3/10. The cough has lessened with increased hycodan and her fevers have resolved.         MEDICATIONS:  Pulmonary:  ALBUTerol    90 MICROgram(s) HFA Inhaler 1 Puff(s) Inhalation every 6 hours  hydrocodone/homatropine Syrup 5 milliLiter(s) Oral every 4 hours PRN    Antimicrobials:  cefTRIAXone   IVPB 2000 milliGRAM(s) IV Intermittent once  doxycycline hyclate Capsule 100 milliGRAM(s) Oral every 12 hours    Anticoagulants:  enoxaparin Injectable 40 milliGRAM(s) SubCutaneous every 24 hours    Cardiac:      Allergies    No Known Allergies    Intolerances        Vital Signs Last 24 Hrs  T(C): 36.8 (26 May 2020 09:00), Max: 37.3 (25 May 2020 17:45)  T(F): 98.3 (26 May 2020 09:00), Max: 99.1 (25 May 2020 17:45)  HR: 69 (26 May 2020 12:00) (46 - 89)  BP: 144/83 (26 May 2020 12:00) (100/56 - 144/83)  BP(mean): 106 (26 May 2020 12:00) (75 - 106)  RR: 40 (26 May 2020 12:00) (12 - 44)  SpO2: 94% (26 May 2020 12:00) (91% - 100%)    05-25 @ 07:01  -  05-26 @ 07:00  --------------------------------------------------------  IN: 610 mL / OUT: 600 mL / NET: 10 mL          PHYSICAL EXAM:    General: Comfortable, laying in bed on nasal cannula  Respiratory: Clear to auscultation bilaterally without wheezing, rhonchi or rales  Cardiovascular: Regular rate and rhythm. S1 and S2 Normal; No murmurs, gallops or rubs  Gastrointestinal: Soft non-tender non-distended; Normal bowel sounds  Extremities:  No clubbing, cyanosis or edema  Neurological: Alert and oriented x3  Skin: Warm and dry. No obvious rash    LABS:      CBC Full  -  ( 26 May 2020 06:27 )  WBC Count : 29.20 K/uL  RBC Count : 3.84 M/uL  Hemoglobin : 11.8 g/dL  Hematocrit : 35.1 %  Platelet Count - Automated : 489 K/uL  Mean Cell Volume : 91.4 fl  Mean Cell Hemoglobin : 30.7 pg  Mean Cell Hemoglobin Concentration : 33.6 gm/dL  Auto Neutrophil # : 27.11 K/uL  Auto Lymphocyte # : 1.22 K/uL  Auto Monocyte # : 0.61 K/uL  Auto Eosinophil # : 0.01 K/uL  Auto Basophil # : 0.04 K/uL  Auto Neutrophil % : 92.9 %  Auto Lymphocyte % : 4.2 %  Auto Monocyte % : 2.1 %  Auto Eosinophil % : 0.0 %  Auto Basophil % : 0.1 %    05-26    136  |  97  |  8   ----------------------------<  140<H>  4.2   |  26  |  0.46<L>    Ca    8.8      26 May 2020 06:28  Phos  3.4     05-26  Mg     2.7     05-26    TPro  6.2  /  Alb  2.9<L>  /  TBili  0.2  /  DBili  x   /  AST  34  /  ALT  16  /  AlkPhos  83  05-26    PT/INR - ( 26 May 2020 06:27 )   PT: 18.2 sec;   INR: 1.57          PTT - ( 26 May 2020 06:27 )  PTT:29.4 sec                  RADIOLOGY & ADDITIONAL STUDIES (The following images were personally reviewed):  < from: Xray Chest 1 View- PORTABLE-Urgent (05.26.20 @ 08:42) >  IMPRESSION:    Support Devices: None  Heart:  Unremarkable  Mediastinum:  Unremarkable  Lungs/Airways: Diffuse bilateral airspace opacities stable  Bones/Soft tissues: Unremarkable    < end of copied text >

## 2020-05-26 NOTE — PROGRESS NOTE ADULT - SUBJECTIVE AND OBJECTIVE BOX
INTERVAL HPI/OVERNIGHT EVENTS:    Patient was seen and examined at bedside.  Fevers have resolved.  Diarrhea has improved.  Still with a cough and some SOB         CONSTITUTIONAL:  Negative fever or chills, feels well, good appetite  EYES:  Negative  blurry vision or double vision  CARDIOVASCULAR:  Negative for chest pain or palpitations  RESPIRATORY:  + cough, no wheezing, + SOB   GASTROINTESTINAL:  Negative for nausea, vomiting, diarrhea, constipation, or abdominal pain  GENITOURINARY:  Negative frequency, urgency or dysuria  NEUROLOGIC:  No headache, confusion, dizziness, lightheadedness      ANTIBIOTICS/RELEVANT:    MEDICATIONS  (STANDING):  acetaminophen   Tablet .. 650 milliGRAM(s) Oral every 6 hours  ALBUTerol    90 MICROgram(s) HFA Inhaler 1 Puff(s) Inhalation every 6 hours  cefTRIAXone   IVPB 2000 milliGRAM(s) IV Intermittent once  doxycycline hyclate Capsule 100 milliGRAM(s) Oral every 12 hours  enoxaparin Injectable 40 milliGRAM(s) SubCutaneous every 24 hours  methylPREDNISolone sodium succinate Injectable 30 milliGRAM(s) IV Push two times a day  ondansetron   Disintegrating Tablet 4 milliGRAM(s) Oral once  oxyCODONE  ER Tablet 10 milliGRAM(s) Oral every 12 hours    MEDICATIONS  (PRN):  clonazePAM  Tablet 1 milliGRAM(s) Oral daily PRN anxiety  hydrocodone/homatropine Syrup 5 milliLiter(s) Oral every 4 hours PRN Cough  promethazine 25 milliGRAM(s) Oral every 6 hours PRN nausea and vomiting        Vital Signs Last 24 Hrs  T(C): 36.8 (26 May 2020 09:00), Max: 37.3 (25 May 2020 17:45)  T(F): 98.3 (26 May 2020 09:00), Max: 99.1 (25 May 2020 17:45)  HR: 69 (26 May 2020 12:00) (46 - 89)  BP: 144/83 (26 May 2020 12:00) (100/56 - 144/83)  BP(mean): 106 (26 May 2020 12:00) (75 - 106)  RR: 40 (26 May 2020 12:00) (12 - 44)  SpO2: 94% (26 May 2020 12:00) (91% - 100%)    PHYSICAL EXAM:  Constitutional:  non-toxic, no distress  Eyes:  no icterus   Ear/Nose/Throat: no oral lesion  Neck  supple  Respiratory: CTA sree  Cardiovascular: S1S2 RRR, no murmurs  Gastrointestinal:soft, (+) BS, no HSM  Extremities:no edema  Vascular: DP Pulse:	right normal; left normal      LABS:                        11.8   29.20 )-----------( 489      ( 26 May 2020 06:27 )             35.1     05-26    136  |  97  |  8   ----------------------------<  140<H>  4.2   |  26  |  0.46<L>    Ca    8.8      26 May 2020 06:28  Phos  3.4     05-26  Mg     2.7     05-26    TPro  6.2  /  Alb  2.9<L>  /  TBili  0.2  /  DBili  x   /  AST  34  /  ALT  16  /  AlkPhos  83  05-26    PT/INR - ( 26 May 2020 06:27 )   PT: 18.2 sec;   INR: 1.57            PTT - ( 26 May 2020 06:27 )  PTT:29.4 sec    Streptococcus Pneumoniae Ag Urine (05.22.20 @ 13:03)    Streptococcus Pneumoniae Ag Urine: Negative: Presumptive negative for pneumococcal pneumonia, suggesting  no current or recent infection.  Infection due to S.  pneumoniae cannot be ruled out since the antigen present in  the sample may be below detection limit of the test.  -------------------ADDITIONAL INFORMATION-------------------  This assay was performed using the FDA-cleared BinaxNOW  Streptococcus pneumoniae Antigen test, a rapid  immunochromatographic assay.  Test Performed by:  [facility] [place]  [address] [place] [address]  : Víctor Busby M.D. Ph.D.; CLIA# [id number]        MICROBIOLOGY:    Culture - Blood (05.24.20 @ 08:27)    Specimen Source: .Blood Blood    Culture Results:   No growth at 2 days.    Culture - Blood (05.24.20 @ 08:27)    Specimen Source: .Blood Blood    Culture Results:   No growth at 2 days.        RADIOLOGY & ADDITIONAL STUDIES:

## 2020-05-27 DIAGNOSIS — U07.0 VAPING-RELATED DISORDER: ICD-10-CM

## 2020-05-27 LAB
ALBUMIN SERPL ELPH-MCNC: 3 G/DL — LOW (ref 3.3–5)
ALP SERPL-CCNC: 70 U/L — SIGNIFICANT CHANGE UP (ref 40–120)
ALT FLD-CCNC: 16 U/L — SIGNIFICANT CHANGE UP (ref 10–45)
ANION GAP SERPL CALC-SCNC: 9 MMOL/L — SIGNIFICANT CHANGE UP (ref 5–17)
APTT BLD: 27.7 SEC — SIGNIFICANT CHANGE UP (ref 27.5–36.3)
AST SERPL-CCNC: 22 U/L — SIGNIFICANT CHANGE UP (ref 10–40)
BASOPHILS # BLD AUTO: 0.05 K/UL — SIGNIFICANT CHANGE UP (ref 0–0.2)
BASOPHILS NFR BLD AUTO: 0.2 % — SIGNIFICANT CHANGE UP (ref 0–2)
BILIRUB SERPL-MCNC: 0.2 MG/DL — SIGNIFICANT CHANGE UP (ref 0.2–1.2)
BUN SERPL-MCNC: 8 MG/DL — SIGNIFICANT CHANGE UP (ref 7–23)
CALCIUM SERPL-MCNC: 9.1 MG/DL — SIGNIFICANT CHANGE UP (ref 8.4–10.5)
CHLORIDE SERPL-SCNC: 99 MMOL/L — SIGNIFICANT CHANGE UP (ref 96–108)
CO2 SERPL-SCNC: 29 MMOL/L — SIGNIFICANT CHANGE UP (ref 22–31)
CREAT SERPL-MCNC: 0.56 MG/DL — SIGNIFICANT CHANGE UP (ref 0.5–1.3)
EOSINOPHIL # BLD AUTO: 0.05 K/UL — SIGNIFICANT CHANGE UP (ref 0–0.5)
EOSINOPHIL NFR BLD AUTO: 0.2 % — SIGNIFICANT CHANGE UP (ref 0–6)
FUNGITELL: <31 PG/ML — SIGNIFICANT CHANGE UP
GLUCOSE SERPL-MCNC: 108 MG/DL — HIGH (ref 70–99)
HCT VFR BLD CALC: 39.4 % — SIGNIFICANT CHANGE UP (ref 34.5–45)
HGB BLD-MCNC: 12.5 G/DL — SIGNIFICANT CHANGE UP (ref 11.5–15.5)
IMM GRANULOCYTES NFR BLD AUTO: 1.9 % — HIGH (ref 0–1.5)
INR BLD: 1.36 — HIGH (ref 0.88–1.16)
LYMPHOCYTES # BLD AUTO: 2.05 K/UL — SIGNIFICANT CHANGE UP (ref 1–3.3)
LYMPHOCYTES # BLD AUTO: 9.2 % — LOW (ref 13–44)
MAGNESIUM SERPL-MCNC: 2.4 MG/DL — SIGNIFICANT CHANGE UP (ref 1.6–2.6)
MCHC RBC-ENTMCNC: 29.8 PG — SIGNIFICANT CHANGE UP (ref 27–34)
MCHC RBC-ENTMCNC: 31.7 GM/DL — LOW (ref 32–36)
MCV RBC AUTO: 93.8 FL — SIGNIFICANT CHANGE UP (ref 80–100)
MONOCYTES # BLD AUTO: 0.73 K/UL — SIGNIFICANT CHANGE UP (ref 0–0.9)
MONOCYTES NFR BLD AUTO: 3.3 % — SIGNIFICANT CHANGE UP (ref 2–14)
NEUTROPHILS # BLD AUTO: 19.09 K/UL — HIGH (ref 1.8–7.4)
NEUTROPHILS NFR BLD AUTO: 85.2 % — HIGH (ref 43–77)
NRBC # BLD: 0 /100 WBCS — SIGNIFICANT CHANGE UP (ref 0–0)
PHOSPHATE SERPL-MCNC: 3.1 MG/DL — SIGNIFICANT CHANGE UP (ref 2.5–4.5)
PLATELET # BLD AUTO: 575 K/UL — HIGH (ref 150–400)
POTASSIUM SERPL-MCNC: 3.8 MMOL/L — SIGNIFICANT CHANGE UP (ref 3.5–5.3)
POTASSIUM SERPL-SCNC: 3.8 MMOL/L — SIGNIFICANT CHANGE UP (ref 3.5–5.3)
PROT SERPL-MCNC: 6.1 G/DL — SIGNIFICANT CHANGE UP (ref 6–8.3)
PROTHROM AB SERPL-ACNC: 15.7 SEC — HIGH (ref 10–12.9)
RBC # BLD: 4.2 M/UL — SIGNIFICANT CHANGE UP (ref 3.8–5.2)
RBC # FLD: 12.6 % — SIGNIFICANT CHANGE UP (ref 10.3–14.5)
RHEUMATOID FACT SERPL-ACNC: 15 IU/ML — HIGH (ref 0–13)
SODIUM SERPL-SCNC: 137 MMOL/L — SIGNIFICANT CHANGE UP (ref 135–145)
WBC # BLD: 22.4 K/UL — HIGH (ref 3.8–10.5)
WBC # FLD AUTO: 22.4 K/UL — HIGH (ref 3.8–10.5)

## 2020-05-27 PROCEDURE — 71045 X-RAY EXAM CHEST 1 VIEW: CPT | Mod: 26

## 2020-05-27 PROCEDURE — 99231 SBSQ HOSP IP/OBS SF/LOW 25: CPT

## 2020-05-27 PROCEDURE — 99233 SBSQ HOSP IP/OBS HIGH 50: CPT | Mod: GC

## 2020-05-27 PROCEDURE — 99232 SBSQ HOSP IP/OBS MODERATE 35: CPT

## 2020-05-27 RX ORDER — CLONAZEPAM 1 MG
1 TABLET ORAL DAILY
Refills: 0 | Status: DISCONTINUED | OUTPATIENT
Start: 2020-05-27 | End: 2020-05-28

## 2020-05-27 RX ORDER — OXYCODONE HYDROCHLORIDE 5 MG/1
5 TABLET ORAL EVERY 4 HOURS
Refills: 0 | Status: DISCONTINUED | OUTPATIENT
Start: 2020-05-27 | End: 2020-05-27

## 2020-05-27 RX ORDER — POTASSIUM CHLORIDE 20 MEQ
40 PACKET (EA) ORAL ONCE
Refills: 0 | Status: COMPLETED | OUTPATIENT
Start: 2020-05-27 | End: 2020-05-27

## 2020-05-27 RX ADMIN — Medication 650 MILLIGRAM(S): at 19:24

## 2020-05-27 RX ADMIN — ENOXAPARIN SODIUM 40 MILLIGRAM(S): 100 INJECTION SUBCUTANEOUS at 06:29

## 2020-05-27 RX ADMIN — Medication 100 MILLIGRAM(S): at 06:25

## 2020-05-27 RX ADMIN — Medication 40 MILLIEQUIVALENT(S): at 11:12

## 2020-05-27 RX ADMIN — Medication 40 MILLIGRAM(S): at 06:25

## 2020-05-27 RX ADMIN — Medication 1 TABLET(S): at 15:49

## 2020-05-27 RX ADMIN — Medication 650 MILLIGRAM(S): at 06:29

## 2020-05-27 RX ADMIN — Medication 650 MILLIGRAM(S): at 11:15

## 2020-05-27 NOTE — PROGRESS NOTE ADULT - PROBLEM SELECTOR PLAN 4
- Promethazine for nausea/vomiting though appears to have resolved  - Pt tolerating solid food today

## 2020-05-27 NOTE — PROGRESS NOTE ADULT - SUBJECTIVE AND OBJECTIVE BOX
19 yo F with PMHx of asthma, anxiety and depression presents to the ED with fever, cough, chills, running nose, nausea, diarrhea, abd pain for four days. Of note, patient was seen at [facility] 5/20 and was worked up, COVID (-), dx with bilateral PNA, d/c'ed on augmentin/doxycyclin and zofran for nausea.  In [facility] ED, CXR and elevated WBC to 18 concerning for bacterial infection superimposed on possible viral pneumonia. Patient started ceftriaxone and azithromycin.  CT PE no pulmonary embolism, but potential worsening of now extensive groundglass opacity. COVID antibodies negative and COVID PCR (-). Patient originally denied smoking history, but later endorsed marijuana vape pod use several times per week and last use 3 days ago prior to admission. Given overall smoking history and CT scan findings suggestive of EVALI. Patient was started on IV steroids 5/24 and has now been transitioned to PO prednisone 40 mg (will complete 5 days course). Patient originally required HFNC as recently as 5/26, but has been weaned all the way down to RA. Patient still gets SOB on ambulation. Patient completed 7 day course of ceftriaxone (5/26) and a few days of azithromycin. ID was brought on board, and due to family contact (sister) with recent tick exposure and patients home being in [place] patient was started on doxycyline. However, given overall picture and low suspicion for tick borne illness doxycyline d/c this AM ehrlichia and anaplasma pcr still pending). Patient currently stable and ready for step down to [facility].     T(C): 36.6 (05-27-20 @ 09:00), Max: 37.5 (05-27-20 @ 01:00)  HR: 72 (05-27-20 @ 09:00) (45 - 95)  BP: 119/68 (05-27-20 @ 09:00) (99/57 - 144/83)  RR: 31 (05-27-20 @ 09:00) (18 - 40)  SpO2: 99% (05-27-20 @ 09:00) (89% - 100%)  Wt(kg): --Vital Signs Last 24 Hrs    Review of Systems:  -All other ROS negative, except those noted in HPI    PHYSICAL EXAM:  GENERAL: NAD, laying in bed  HEAD:  Atraumatic, Normocephalic  EYES: EOMI, PERRLA, conjunctiva and sclera clear  ENMT: Moist mucous membranes, Good dentition, No lesions  NECK: Supple, No JVD,  NERVOUS SYSTEM:  Alert & Oriented X3, Good concentration;   CHEST/LUNG: Clear to percussion bilaterally; No rales, rhonchi, wheezing, or rubs  HEART: Regular rate and rhythm; No murmurs, rubs, or gallops  ABDOMEN: Soft, Nontender, Nondistended; Bowel sounds present  EXTREMITIES:  2+ Peripheral Pulses, No clubbing, cyanosis, or edema  LYMPH: No lymphadenopathy noted  SKIN: No rashes or lesions    acetaminophen   Tablet .. 650 milliGRAM(s) Oral every 6 hours  ALBUTerol    90 MICROgram(s) HFA Inhaler 1 Puff(s) Inhalation every 6 hours  clonazePAM  Tablet 1 milliGRAM(s) Oral daily PRN  enoxaparin Injectable 40 milliGRAM(s) SubCutaneous every 24 hours  hydrocodone/homatropine Syrup 5 milliLiter(s) Oral every 4 hours PRN  oxyCODONE    IR 5 milliGRAM(s) Oral every 4 hours PRN  predniSONE   Tablet 40 milliGRAM(s) Oral every 24 hours  promethazine 25 milliGRAM(s) Oral every 6 hours PRN      LABS:                        12.5   22.40 )-----------( 575      ( 27 May 2020 06:41 )             39.4     05-27    137  |  99  |  8   ----------------------------<  108<H>  3.8   |  29  |  0.56    Ca    9.1      27 May 2020 06:41  Phos  3.1     05-27  Mg     2.4     05-27    TPro  6.1  /  Alb  3.0<L>  /  TBili  0.2  /  DBili  x   /  AST  22  /  ALT  16  /  AlkPhos  70  05-27    PT/INR - ( 27 May 2020 06:41 )   PT: 15.7 sec;   INR: 1.36          PTT - ( 27 May 2020 06:41 )  PTT:27.7 sec    CAPILLARY BLOOD GLUCOSE

## 2020-05-27 NOTE — PROGRESS NOTE ADULT - ASSESSMENT
IMPRESSION:  Febrile illness --> concern for COVID-19 infection however both PCR and antibodies are negative making this less likely.  Community-acquired pneumonia is also on the differential.  She is being well-covered for the typical CAP pathogens with Ceftriaxone and Azithromycin but she has not responded at this time.  Infectious diseases work up is negative.  She has responded to steroids which makes vaping lung injury more likely     She has no risk factors for MRSA, pseudomonas aeruginosa and ESBL Enterobactericae    In her history the only recent significant epidemiological exposure was hiking in [facility] which puts her at risk for tick borne illness; however this would not explain her respiratory symptoms     Recommend:  1.  Agree with stopping Ceftriaxone and Doxycycline (low suspicion for tick borne illness)      ID team 1 will sign off.  Reconsult as needed

## 2020-05-27 NOTE — PROGRESS NOTE ADULT - SUBJECTIVE AND OBJECTIVE BOX
TRANSFER FROM MICU TO [facility]    Hospital Course  21 yo F with PMHx of asthma, anxiety and depression presents to the ED with fever, cough, chills, running nose, nausea, diarrhea, abd pain for four days. Of note, patient was seen at [facility] 5/20 and was worked up, COVID (-), dx with bilateral PNA, d/c'ed on augmentin/doxycyclin and zofran for nausea.  In [facility] ED, CXR and elevated WBC to 18 concerning for bacterial infection superimposed on possible viral pneumonia. Patient started ceftriaxone and azithromycin.  CT PE no pulmonary embolism, but potential worsening of now extensive groundglass opacity. COVID antibodies negative and COVID PCR (-). Patient originally denied smoking history, but later endorsed marijuana vape pod use several times per week and last use 3 days ago prior to admission. Given overall smoking history and CT scan findings suggestive of EVALI. Patient was started on IV steroids 5/24 and has now been transitioned to PO prednisone 40 mg (will complete 5 days course). Patient originally required HFNC as recently as 5/26, but has been weaned all the way down to RA. Patient still gets SOB on ambulation. Patient completed 7 day course of ceftriaxone (5/26) and a few days of azithromycin. ID was brought on board, and due to family contact (sister) with recent tick exposure and patients home being in [place] patient was started on doxycyline. However, given overall picture and low suspicion for tick borne illness doxycyline d/c this AM ehrlichia and anaplasma pcr still pending). Patient currently stable and ready for step down to [facility].    SUBJECTIVE / INTERVAL HPI: Patient seen and examined at bedside. States that she feels significantly improved. She feels only mildly dyspneic when laying in bed, but     VITAL SIGNS:  Vital Signs Last 24 Hrs  T(C): 36.8 (27 May 2020 12:00), Max: 37.5 (27 May 2020 01:00)  T(F): 98.3 (27 May 2020 12:00), Max: 99.5 (27 May 2020 01:00)  HR: 58 (27 May 2020 12:00) (45 - 95)  BP: 115/67 (27 May 2020 12:00) (99/57 - 135/81)  BP(mean): 86 (27 May 2020 12:00) (73 - 104)  RR: 35 (27 May 2020 12:00) (18 - 40)  SpO2: 97% (27 May 2020 12:00) (89% - 100%)    PHYSICAL EXAM:    General: WDWN  HEENT: NC/AT; PERRL, anicteric sclera; MMM  Neck: supple  Cardiovascular: +S1/S2; RRR  Respiratory: CTA B/L; no W/R/R  Gastrointestinal: soft, NT/ND; +BSx4  Extremities: WWP; no edema, clubbing or cyanosis  Vascular: 2+ radial, DP/PT pulses B/L  Neurological: AAOx3; no focal deficits    MEDICATIONS:  MEDICATIONS  (STANDING):  acetaminophen   Tablet .. 650 milliGRAM(s) Oral every 6 hours  ALBUTerol    90 MICROgram(s) HFA Inhaler 1 Puff(s) Inhalation every 6 hours  enoxaparin Injectable 40 milliGRAM(s) SubCutaneous every 24 hours  multivitamin 1 Tablet(s) Oral daily  predniSONE   Tablet 40 milliGRAM(s) Oral every 24 hours    MEDICATIONS  (PRN):  clonazePAM  Tablet 1 milliGRAM(s) Oral daily PRN anxiety  hydrocodone/homatropine Syrup 5 milliLiter(s) Oral every 4 hours PRN Cough  promethazine 25 milliGRAM(s) Oral every 6 hours PRN nausea and vomiting      ALLERGIES:  Allergies    No Known Allergies    Intolerances        LABS:                        12.5   22.40 )-----------( 575      ( 27 May 2020 06:41 )             39.4     05-27    137  |  99  |  8   ----------------------------<  108<H>  3.8   |  29  |  0.56    Ca    9.1      27 May 2020 06:41  Phos  3.1     05-27  Mg     2.4     05-27    TPro  6.1  /  Alb  3.0<L>  /  TBili  0.2  /  DBili  x   /  AST  22  /  ALT  16  /  AlkPhos  70  05-27    PT/INR - ( 27 May 2020 06:41 )   PT: 15.7 sec;   INR: 1.36          PTT - ( 27 May 2020 06:41 )  PTT:27.7 sec    CAPILLARY BLOOD GLUCOSE          RADIOLOGY & ADDITIONAL TESTS: Reviewed. TRANSFER FROM MICU TO [facility]    Hospital Course  19 yo F with PMHx of asthma, anxiety and depression presents to the ED with fever, cough, chills, running nose, nausea, diarrhea, abd pain for four days. Of note, patient was seen at [facility] 5/20 and was worked up, COVID (-), dx with bilateral PNA, d/c'ed on augmentin/doxycyclin and zofran for nausea.  In [facility] ED, CXR and elevated WBC to 18 concerning for bacterial infection superimposed on possible viral pneumonia. Patient started ceftriaxone and azithromycin.  CT PE no pulmonary embolism, but potential worsening of now extensive groundglass opacity. COVID antibodies negative and COVID PCR (-). Patient originally denied smoking history, but later endorsed marijuana vape pod use several times per week and last use 3 days ago prior to admission. Given overall smoking history and CT scan findings suggestive of EVALI. Patient was started on IV steroids 5/24 and has now been transitioned to PO prednisone 40 mg (will complete 5 days course). Patient originally required HFNC as recently as 5/26, but has been weaned all the way down to RA. Patient still gets SOB on ambulation. Patient completed 7 day course of ceftriaxone (5/26) and a few days of azithromycin. ID was brought on board, and due to family contact (sister) with recent tick exposure and patients home being in [place] patient was started on doxycyline. However, given overall picture and low suspicion for tick borne illness doxycyline d/c this AM ehrlichia and anaplasma pcr still pending). Patient currently stable and ready for step down to [facility].    SUBJECTIVE / INTERVAL HPI: Patient seen and examined at bedside. States that she feels significantly improved. She feels only mildly dyspneic when laying in bed, but still feels severely dyspneic when she ambulates. Otherwise, patient with no other complaints. States that she was able to tolerate solid food today. Her last episode of diarrhea was yesterday. She also reports her nausea has resolved. Denies fevers/chills, chest pain, abdominal pain, nausea/vomiting.    VITAL SIGNS:  Vital Signs Last 24 Hrs  T(C): 36.8 (27 May 2020 12:00), Max: 37.5 (27 May 2020 01:00)  T(F): 98.3 (27 May 2020 12:00), Max: 99.5 (27 May 2020 01:00)  HR: 58 (27 May 2020 12:00) (45 - 95)  BP: 115/67 (27 May 2020 12:00) (99/57 - 135/81)  BP(mean): 86 (27 May 2020 12:00) (73 - 104)  RR: 35 (27 May 2020 12:00) (18 - 40)  SpO2: 97% (27 May 2020 12:00) (89% - 100%)    PHYSICAL EXAM:    General: Young female sitting comfortably in bed, in NAD  HEENT: NC/AT; PERRL, anicteric sclera; MMM  Neck: supple  Cardiovascular: +S1/S2; RRR  Respiratory: CTA B/L; no W/R/R, becomes mildly tachypneic as she shifts positions during physical exam  Gastrointestinal: soft, NT/ND; +BSx4  Extremities: WWP; no edema, clubbing or cyanosis  Vascular: 2+ radial, DP/PT pulses B/L  Neurological: AAOx3; no focal deficits    MEDICATIONS:  MEDICATIONS  (STANDING):  acetaminophen   Tablet .. 650 milliGRAM(s) Oral every 6 hours  ALBUTerol    90 MICROgram(s) HFA Inhaler 1 Puff(s) Inhalation every 6 hours  enoxaparin Injectable 40 milliGRAM(s) SubCutaneous every 24 hours  multivitamin 1 Tablet(s) Oral daily  predniSONE   Tablet 40 milliGRAM(s) Oral every 24 hours    MEDICATIONS  (PRN):  clonazePAM  Tablet 1 milliGRAM(s) Oral daily PRN anxiety  hydrocodone/homatropine Syrup 5 milliLiter(s) Oral every 4 hours PRN Cough  promethazine 25 milliGRAM(s) Oral every 6 hours PRN nausea and vomiting      ALLERGIES:  Allergies    No Known Allergies    Intolerances        LABS:                        12.5   22.40 )-----------( 575      ( 27 May 2020 06:41 )             39.4     05-27    137  |  99  |  8   ----------------------------<  108<H>  3.8   |  29  |  0.56    Ca    9.1      27 May 2020 06:41  Phos  3.1     05-27  Mg     2.4     05-27    TPro  6.1  /  Alb  3.0<L>  /  TBili  0.2  /  DBili  x   /  AST  22  /  ALT  16  /  AlkPhos  70  05-27    PT/INR - ( 27 May 2020 06:41 )   PT: 15.7 sec;   INR: 1.36          PTT - ( 27 May 2020 06:41 )  PTT:27.7 sec    CAPILLARY BLOOD GLUCOSE          RADIOLOGY & ADDITIONAL TESTS: Reviewed.

## 2020-05-27 NOTE — PROGRESS NOTE ADULT - SUBJECTIVE AND OBJECTIVE BOX
Pulm Progress note:   No significant overnight events.  dry cough still present but better overall.   On 2 L NC , I turned it off still satting 96%, Nurse made aware.   Feels drained , wants to go home.     MEDICATIONS:  Pulmonary:  ALBUTerol    90 MICROgram(s) HFA Inhaler 1 Puff(s) Inhalation every 6 hours  hydrocodone/homatropine Syrup 5 milliLiter(s) Oral every 6 hours PRN    Antimicrobials:  azithromycin  IVPB 500 milliGRAM(s) IV Intermittent every 24 hours  cefTRIAXone   IVPB 2000 milliGRAM(s) IV Intermittent every 24 hours  doxycycline hyclate Capsule 100 milliGRAM(s) Oral every 12 hours    Anticoagulants:  enoxaparin Injectable 40 milliGRAM(s) SubCutaneous every 24 hours    Cardiac:      Allergies    No Known Allergies    Intolerances        Vital Signs Last 24 Hrs  T(C): 36.9 (25 May 2020 10:00), Max: 39 (24 May 2020 12:00)  T(F): 98.5 (25 May 2020 10:00), Max: 102.2 (24 May 2020 12:00)  HR: 85 (25 May 2020 09:30) (50 - 115)  BP: 122/75 (25 May 2020 09:00) (98/52 - 134/86)  BP(mean): 83 (25 May 2020 08:00) (69 - 106)  RR: 35 (25 May 2020 09:35) (18 - 47)  SpO2: 97% (25 May 2020 09:35) (84% - 100%)    05-24 @ 07:01  -  05-25 @ 07:00  --------------------------------------------------------  IN: 240 mL / OUT: 2400 mL / NET: -2160 mL          PHYSICAL EXAM:    General: comfortable at rest  Respiratory: Clear to auscultation bilaterally without wheezing, rhonchi or rales  Cardiovascular: Regular rate and rhythm. S1 and S2 Normal; No murmurs, gallops or rubs  Gastrointestinal: Soft, noepigastric tenderness, non-distended; Normal bowel sounds  Extremities: Normal range of motion, No clubbing, cyanosis or edema  Neurological: Alert and oriented x3  Skin: Warm and dry. No obvious rash    LABS:                        12.5   22.40 )-----------( 575      ( 27 May 2020 06:41 )             39.4   05-27    137  |  99  |  8   ----------------------------<  108<H>  3.8   |  29  |  0.56    Ca    9.1      27 May 2020 06:41  Phos  3.1     05-27  Mg     2.4     05-27      < from: Xray Chest 1 View- PORTABLE-Urgent (05.26.20 @ 08:42) >  IMPRESSION:    Support Devices: None  Heart:  Unremarkable  Mediastinum:  Unremarkable  Lungs/Airways: Diffuse bilateral airspace opacities stable  Bones/Soft tissues: Unremarkable    < end of copied text >

## 2020-05-27 NOTE — PROGRESS NOTE ADULT - ASSESSMENT
21 yo F with hx of anxiety and depression, p/w acute hypoxic respiratory failure and diarrhea associated with leukocytosis and fever, diffuse GGO on CT chest, concerning for EVALI in setting of multiple negative COVID swab.     Acute hypoxemic respiratory failure: improved.   Presented with high grade fevers, high inflammatory markers and diffuse GGO on CT chest. COVID negative multiple times. Hx of vapping concerning for EVALI.   Initially treated with broad spectrum antibiotics but continued to worsen needing HFNC.   Now much improved with steroids , on RA with drastic improvement in clinical and radiological picture.  Recommendations:   - C/w Prednisone 40 mg once a day for now, taper can be done as quickly as over 10 days under the guidance of a Pulmonologist. She is from [place] and would like to follow up with a Pulmonologist there if she can find one.   - Can follow up with [address] [place] [address] with in a week of discharge if she does not have any appointment neat [place].  - c/w hycodan for cough.   - Anxiety is well controlled.  - OOBTC and incentive spirometry  - Check Ambulatory oxygen saturation, might need oxygen for sometime. 19 yo F with hx of anxiety and depression, p/w acute hypoxic respiratory failure and diarrhea associated with leukocytosis and fever, diffuse GGO on CT chest, concerning for EVALI in setting of multiple negative COVID swab.     Acute hypoxemic respiratory failure: improved.   Presented with high grade fevers, high inflammatory markers and diffuse GGO on CT chest. COVID negative multiple times. Hx of vaping concerning for EVALI.   Initially treated with broad spectrum antibiotics but continued to worsen needing HFNC.   Now much improved with steroids , on RA with drastic improvement in clinical and radiological picture.  Recommendations:   - C/w Prednisone 40 mg once a day for now, taper can be done as quickly as over 10 days under the guidance of a Pulmonologist. She is from [place] and would like to follow up with a Pulmonologist there if she can find one.   - Can follow up with [address] [place] [address] with in a week of discharge if she does not have any appointment neat [place].  - c/w hycodan for cough.   - Anxiety is well controlled.  - OOBTC and incentive spirometry  - Check Ambulatory oxygen saturation, might need oxygen for sometime.

## 2020-05-27 NOTE — PROGRESS NOTE ADULT - PROBLEM SELECTOR PLAN 2
Pt febrile to 103.3F, tachycardic to 131, WBC 20 (3/4 SIRS criteria) on admission. CXR with LLL consolidative process (now improved)  -completed 7 day course of ceftriaxone. Completed azithromycin  - MRSA swab negative  - blood cx NGTD   - f/u urine legionella negative  - f/u send tick-borne disease antibodies, EHRLICHIA/ANAPLASMA PCR on serum. Doxycycline 100 mg BID D/C this morning in the setting of low likelihood of tickborne disease

## 2020-05-27 NOTE — PROGRESS NOTE ADULT - ASSESSMENT
Pt is a 20F PMH anxiety and depression presenting on 5/21 with fever, cough, chills, running nose, nausea, diarrhea, abd pain x3 days. Possibly 2/2 COVID-19 (negative) and/or bacterial process (CAP) with sepsis. Initially admitted to [facility], transferred to  for possible  bronchoscopy. Now COVID PCR swab negative x2, transferred to non COVID ICU on 5/23.     Neuro  - No active issues  - oxycodone 5 mg IR for pain control    Respiratory  #Electronic vaping-associated lung injury (EVALI)  Pt presented with fever/chills, cough; WBC 18, lymphopenic with ALC 0.87, CT chest with bilateral ground glass opacities. marijuana vape pod use, most recently 3 days prior to admission  -CTA 5/22 neg for PE but atypical for bacterial pneumonia, with worsening b/l ground glass opacities. if non-infectious, other atypical pathogens (PCP) are also possible. Some of these findings are likely confounded by IV fluid administration (got 2 L NS in ED) though, given the small pleural effusion and septal thickening concurrently seen with the GGO's. Bedside ultrasound also showed thick, fan-like b-lines bilaterally suggestive of pulmonary edema which likely is noncardiogenic. Low suspicion for autoimmune given no systemic manifestations or family history. Given her history of e-cigarette use with marijuana cartridges (uses everyday, last use 2 days ago)    - Patient showed consistent improvement with steroids, started 5/24 (initially on solumedrol IV BID 5/24 to prednisone 40 mg ). patient afebrile,  and breathing looks less labored than previous days likely with improvement 2/2 to decreasing fever curve.   -AM xray greatly improved and patient currently comfortable on RA  - If clinical status becomes worse including oxygenation and/or radiographically, then will likely need a bronchoscopy with TBBx and BAL to r/o infx including atypicals such as PCP.   - COVID swab PCR neg x2 and antibody negative  - Hycodan, tessalon perle for cough suppression. Can wean patient of hycodan as tolerated.   -encourage ambulation    #Bacterial pneumonia  Pt febrile to 103.3F, tachycardic to 131, WBC 20 (3/4 SIRS criteria). CXR with LLL consolidative process (now improved)  -completed 7 day course of ceftriaxone. Completed azithromycin  - MRSA swab negative  - blood cx NGTD   - f/u urine legionella negative  - f/u send tick-borne disease antibodies, EHRLICHIA/ANAPLASMA PCR on serum. Doxycycline 100 mg BID D/C this morning in the setting of low likelihood of tickborne disease      ID  - Management as above    Cardiovascular  - No active issues    GI  #Diarrhea  Multiple documented cases of EVALI presenting with GI sxs. Given diarrhea prior to onset of abx, low suspicion for other infectious causes. Now further work up at this time.  - Pt reports four days of loose stools, non-bloody with no mucus--> With no diarrhea overnight. Will continue to monitor-if with resumption of diarhhea will send c.diff as patient on multiple abx- although with benign GI exam.   - GI PCR negative  - f/u stool culture  - Continue to monitor    #Nausea  - Promethazine for nausea/vomiting    #Nutrition  - Pt unable to tolerate much PO  - Clear liquid diet    Renal  - BUN/Cr 3/0.58    Heme  - none    Psych  #Anxiety  Pt on klonopin 1mg daily PRN and is noncompliant with lexapro 20mg daily  - Continue klonopin 1mg daily PRN    Endocrine  - No active issues    F: None  E: Replete PRN for K<4, Mg<2  N: Clear liquid  DVT PPx: LSQ DVT ppx   GI PPx: None  Dispo: [facility]

## 2020-05-27 NOTE — PROGRESS NOTE ADULT - SUBJECTIVE AND OBJECTIVE BOX
INTERVAL HPI/OVERNIGHT EVENTS:    Patient was seen and examined at bedside.  Feels better.  Afebrile.  On room air     CONSTITUTIONAL:  Negative fever or chills, feels well, good appetite  EYES:  Negative  blurry vision or double vision  CARDIOVASCULAR:  Negative for chest pain or palpitations  RESPIRATORY:  Negative for cough, wheezing, or SOB   GASTROINTESTINAL:  Negative for nausea, vomiting, diarrhea, constipation, or abdominal pain  GENITOURINARY:  Negative frequency, urgency or dysuria  NEUROLOGIC:  No headache, confusion, dizziness, lightheadedness      ANTIBIOTICS/RELEVANT:    MEDICATIONS  (STANDING):  acetaminophen   Tablet .. 650 milliGRAM(s) Oral every 6 hours  ALBUTerol    90 MICROgram(s) HFA Inhaler 1 Puff(s) Inhalation every 6 hours  enoxaparin Injectable 40 milliGRAM(s) SubCutaneous every 24 hours  multivitamin 1 Tablet(s) Oral daily  predniSONE   Tablet 40 milliGRAM(s) Oral every 24 hours    MEDICATIONS  (PRN):  clonazePAM  Tablet 1 milliGRAM(s) Oral daily PRN anxiety  hydrocodone/homatropine Syrup 5 milliLiter(s) Oral every 4 hours PRN Cough  promethazine 25 milliGRAM(s) Oral every 6 hours PRN nausea and vomiting        Vital Signs Last 24 Hrs  T(C): 36.6 (27 May 2020 15:39), Max: 37.5 (27 May 2020 01:00)  T(F): 97.9 (27 May 2020 15:39), Max: 99.5 (27 May 2020 01:00)  HR: 69 (27 May 2020 15:39) (45 - 95)  BP: 127/84 (27 May 2020 15:39) (101/66 - 135/81)  BP(mean): 86 (27 May 2020 12:00) (73 - 104)  RR: 20 (27 May 2020 15:39) (18 - 40)  SpO2: 96% (27 May 2020 15:39) (93% - 100%)    PHYSICAL EXAM:  Constitutional:  non-toxic,  no distress  Eyes:  no icterus   Ear/Nose/Throat: no oral lesion   Neck:  supple  Respiratory:   clear to auscultation  Cardiovascular: S1S2 RRR, no murmurs  Gastrointestinal:soft, (+) BS, no HSM  Extremities:no edema  Vascular: DP Pulse:	right normal; left normal      LABS:                        12.5   22.40 )-----------( 575      ( 27 May 2020 06:41 )             39.4     05-27    137  |  99  |  8   ----------------------------<  108<H>  3.8   |  29  |  0.56    Ca    9.1      27 May 2020 06:41  Phos  3.1     05-27  Mg     2.4     05-27    TPro  6.1  /  Alb  3.0<L>  /  TBili  0.2  /  DBili  x   /  AST  22  /  ALT  16  /  AlkPhos  70  05-27    PT/INR - ( 27 May 2020 06:41 )   PT: 15.7 sec;   INR: 1.36          PTT - ( 27 May 2020 06:41 )  PTT:27.7 sec      MICROBIOLOGY:    Culture - Blood (05.24.20 @ 08:27)    Specimen Source: .Blood Blood    Culture Results:   No growth at 3 days.    Culture - Blood (05.24.20 @ 08:27)    Specimen Source: .Blood Blood    Culture Results:   No growth at 3 days.    Culture - Urine (05.23.20 @ 08:45)    Specimen Source: .Urine Clean Catch (Midstream)    Culture Results:   No growth        RADIOLOGY & ADDITIONAL STUDIES:    < from: Xray Chest 1 View- PORTABLE-Urgent (05.26.20 @ 08:42) >    IMPRESSION:    Support Devices: None  Heart:  Unremarkable  Mediastinum:  Unremarkable  Lungs/Airways: Diffuse bilateral airspace opacities stable  Bones/Soft tissues: Unremarkable

## 2020-05-27 NOTE — PROGRESS NOTE ADULT - PROBLEM SELECTOR PLAN 1
Pt presented with fever/chills, cough; WBC 18, lymphopenic with ALC 0.87, CT chest with bilateral ground glass opacities. Marijuana vape pod use, most recently 3 days prior to admission. Now with high suspicion for EVALI (emergency vaping associated lung injury)  -CTA 5/22 neg for PE but concerning for atypical bacterial pneumonia, with worsening b/l ground glass opacities. Low suspicion for autoimmune given no systemic manifestations or family history. Given her history of e-cigarette use with marijuana cartridges (uses everyday, last use 2 days ago), EVALI is most likely    - Patient showed consistent improvement with steroids, started 5/24 (initially on solumedrol IV BID 5/24 now switched to prednisone 40 mg ). patient afebrile,  and breathing looks less labored than previous days likely with improvement 2/2 to decreasing fever curve.   -Pulm following. Will need clarification on steroid dose and will need pulm follow up  - AM xray greatly improved and patient currently comfortable on RA  - If clinical status becomes worse including oxygenation and/or radiographically, then will likely need a bronchoscopy with TBBx and BAL to r/o infx including atypicals such as PCP.   - COVID swab PCR neg x2 and antibody negative  - Hycodan, tessalon perle for cough suppression. Can wean patient of hycodan as tolerated.   -encourage ambulation

## 2020-05-27 NOTE — CHART NOTE - NSCHARTNOTEFT_GEN_A_CORE
Admitting Diagnosis:   Patient is a 20y old  Female who presents with a chief complaint of Pneumonia, r/o COVID-19 (26 May 2020 13:19)      PAST MEDICAL & SURGICAL HISTORY:  Anxiety and depression  History of tonsillectomy      Current Nutrition Order:  regular diet, ensure enlive x3/day     PO Intake: Excellent (%) [   ]  Good (50-75%) [   ]  Fair (25-50%) [   ]  Poor (<25%) [   ]  Please see Below       GI Issues:   Diarrhea PTA noted, per progress notes- Multiple documented cases of EVALI presenting with GI sxs. Given diarrhea prior to onset of abx, low suspicion for other infectious causes. Now further work up at this time    Pain:  Per flow sheets, non-verbal indicators of pain/discomfort absent    Skin Integrity:  Intact pressure wise  No edema       Labs:   05-27    137  |  99  |  8   ----------------------------<  108<H>  3.8   |  29  |  0.56    Ca    9.1      27 May 2020 06:41  Phos  3.1     05-27  Mg     2.4     05-27    TPro  6.1  /  Alb  3.0<L>  /  TBili  0.2  /  DBili  x   /  AST  22  /  ALT  16  /  AlkPhos  70  05-27    CAPILLARY BLOOD GLUCOSE          Medications:  MEDICATIONS  (STANDING):  acetaminophen   Tablet .. 650 milliGRAM(s) Oral every 6 hours  ALBUTerol    90 MICROgram(s) HFA Inhaler 1 Puff(s) Inhalation every 6 hours  doxycycline hyclate Capsule 100 milliGRAM(s) Oral every 12 hours  enoxaparin Injectable 40 milliGRAM(s) SubCutaneous every 24 hours  potassium chloride   Powder 40 milliEquivalent(s) Oral once  predniSONE   Tablet 40 milliGRAM(s) Oral every 24 hours    MEDICATIONS  (PRN):  clonazePAM  Tablet 1 milliGRAM(s) Oral daily PRN anxiety  hydrocodone/homatropine Syrup 5 milliLiter(s) Oral every 4 hours PRN Cough  oxyCODONE    IR 5 milliGRAM(s) Oral every 4 hours PRN Severe Pain (7 - 10)  promethazine 25 milliGRAM(s) Oral every 6 hours PRN nausea and vomiting      Admitted Anthropometrics:  5'3" IBW 115lbs+/-10%  Weight: 134lbs   %%, BMI 23.8   Weight Change: Based most most recent EMR wt       Nutrition Focused Physical Exam: Completed [   ]  Unable to complete [   ] N/A      Estimated energy needs:   ABW (60.8kg) used for calculations as pt between % of IBW.   Nutrient needs based on [facility] standards of care for maintenance in adults.   Needs adjusted for PNA/inflammatory state, prolonged poor PO intake  1520-1824kcal/day (25-30kcal/kg)  61-73g pro/day (1-1.2g pro/kg)  Fluids per team    Subjective:   21yo F PMH anxiety and depression presenting on 5/21 with fever, cough, chills, running nose, nausea, diarrhea, abd pain x3 days. Possibly 2/2 COVID-19 and/or bacterial process. Initially admitted to [facility], transferred to  for possible bronchoscopy. Pt is now COVID PCR swab negative x2 (5/21 5/22) + antibody test negative, and was moved to a non COVID ICU on unit, currently on [facility]. Pt with  Acute hypoxemic respiratory failure, clinical picture consistent with EVALI. Since initiation of steroids and standing pain control regimen there has been an overall improvement in respiratory status, fever curve, and subjective symptoms. Had been planned for bronch however d/c, noted If clinical status becomes worse including oxygenation and/or radiographically, then will likely need a bronch. Infectious diseases work up is negative, MRSA swab negative.   Please see below for nutritions recommendations.       PRIOR Nutrition Diagnosis: Inadequate oral intake RT unable to meet needs w/ NPO diet order AEB meeting 0% EER at present  Resolved as pt with order for PO diet  NEW PES:  Goal: Pt to consistently meet % of estimated needs     Recommendations:  1. Recommend advancing back to clears once medically feasible. Encourage EnsureClears as tolerated.  2. Nausea management (zofran, promethazine) PRN  3. Replete lytes PRN (noted low phos and K)  4. Consider metamucil if diarrhea persists and probiotic  *d/w team.     Education:   N/A    Risk Level: High [ x  ] Moderate [   ] Low [   ]. Admitting Diagnosis:   Patient is a 20y old  Female who presents with a chief complaint of Pneumonia, r/o COVID-19 (26 May 2020 13:19)      PAST MEDICAL & SURGICAL HISTORY:  Anxiety and depression  History of tonsillectomy      Current Nutrition Order:  regular diet, ensure enlive x3/day     PO Intake: Excellent (%) [   ]  Good (50-75%) [   ]  Fair (25-50%) [   ]  Poor (<25%) [   ]  Please see Below     GI Issues:   Diarrhea PTA noted  Per progress notes- Multiple documented cases of EVALI presenting with GI sxs. Given diarrhea prior to onset of abx, low suspicion for other infectious causes, Now further work up at this time  Pt remains with c/o Diarrhea, RN unable to comment as pt no linger using bed pan and able to use toilet     Pain:  Per flow sheets, non-verbal indicators of pain/discomfort absent    Skin Integrity:  Intact pressure wise  No edema       Labs:   05-27    137  |  99  |  8   ----------------------------<  108<H>  3.8   |  29  |  0.56    Ca    9.1      27 May 2020 06:41  Phos  3.1     05-27  Mg     2.4     05-27    TPro  6.1  /  Alb  3.0<L>  /  TBili  0.2  /  DBili  x   /  AST  22  /  ALT  16  /  AlkPhos  70  05-27    CAPILLARY BLOOD GLUCOSE          Medications:  MEDICATIONS  (STANDING):  acetaminophen   Tablet .. 650 milliGRAM(s) Oral every 6 hours  ALBUTerol    90 MICROgram(s) HFA Inhaler 1 Puff(s) Inhalation every 6 hours  doxycycline hyclate Capsule 100 milliGRAM(s) Oral every 12 hours  enoxaparin Injectable 40 milliGRAM(s) SubCutaneous every 24 hours  potassium chloride   Powder 40 milliEquivalent(s) Oral once  predniSONE   Tablet 40 milliGRAM(s) Oral every 24 hours    MEDICATIONS  (PRN):  clonazePAM  Tablet 1 milliGRAM(s) Oral daily PRN anxiety  hydrocodone/homatropine Syrup 5 milliLiter(s) Oral every 4 hours PRN Cough  oxyCODONE    IR 5 milliGRAM(s) Oral every 4 hours PRN Severe Pain (7 - 10)  promethazine 25 milliGRAM(s) Oral every 6 hours PRN nausea and vomiting      Admitted Anthropometrics:  5'3" IBW 115lbs+/-10%  Weight: 134lbs   %%, BMI 23.8   Weight Change: Based most recent EMR wt     Nutrition Focused Physical Exam: Completed [   ]  Unable to complete [   ] N/A    Estimated energy needs:   ABW (60.8kg) used for calculations as pt between % of IBW.   Nutrient needs based on [facility] standards of care for maintenance in adults.   Needs adjusted for PNA/inflammatory state, suspected malnutrition   1520-1824kcal/day (25-30kcal/kg)  61-73g pro/day (1.0-1.2g pro/kg)  Fluids per team    Subjective:   21yo F PMH anxiety and depression presenting on 5/21 with fever, cough, chills, running nose, nausea, diarrhea, abd pain x3 days. Possibly 2/2 COVID-19 and/or bacterial process. Initially admitted to [facility], transferred to  for possible bronchoscopy. Pt is now COVID PCR swab negative x2 (5/21 5/22) + antibody test negative, and was moved to a non COVID ICU on unit, currently on [facility]. Pt with  Acute hypoxemic respiratory failure, clinical picture consistent with EVALI. Since initiation of steroids and standing pain control regimen there has been an overall improvement in respiratory status, fever curve, and subjective symptoms. Had been planned for bronch however d/c, noted If clinical status becomes worse including oxygenation and/or radiographically, then will likely need a bronch. Infectious diseases work up is negative, MRSA swab negative.   Spoke with pt/RN. Pt curled up in bed, making visual assessment difficult however did not appear with s/s of fat/muscle wasting from visual assessment. Despite appearing without s/s of wasting, pt reports wt loss that she feels occurred during admission d/t limited PO intake and GI distress.  pounds PTA, wt of 134 pounds noted 5/21 which suggests wt loss of 6 pounds / 4.2% body wt loss (significant). During admission pt had been NPO/ on clear liquid diet 5/21-5/23 + 5/25-5/26 (inadequate diet x ~5days). Pt now ordered for regular diet + Ensure enlive x3/day. Pt reports limited intake during admission, RN reports consuming ~25-50% of breakfast today. Pt has not been taking ensure, does not like and reports will not consume. Reports trying to consume bland items d/t GI. Pt denies issues chewing/swallowing, NKFA.   Please see below for nutritions recommendations, spoke with team. Pending order placed.       PRIOR Nutrition Diagnosis: Inadequate oral intake RT unable to meet needs w/ NPO diet order AEB meeting 0% EER at present  Resolved as pt with order for PO diet  NEW PES: Suspected Mild Malnutrition RT presumed intake < EER in setting of increased need exacerbated by GI AEB wt loss, decreased PO intake/inadequate diet   Goal: Pt to consistently meet % of estimated needs     Recommendations:  1. Recommend regular diet. Consider use of low fiber PRN Pending %PO.  2. Monitor %PO intake. Suggest d/c oral nutrition supplements as pt reports she does not like and will not take in, if agreeable to consuming consider ensure pudding > Enlive d/t diarrhea.  3. Monitor Skin, Labs, Wts, GOC.  4. Monitor GI- consider probiotic use vs fiber agent PRN.  5. MVI daily.  6. RD to remain available for additional nutrition interventions as needed.     Education:   Encourage PO intake during meals & reviewed importance. Provided tips for GI distress- suggested low fiber items to aide in diarrhea. Understanding stated, provide additional motivation as needed.     Risk Level: High [ x  ] Moderate [   ] Low [   ].

## 2020-05-27 NOTE — PROGRESS NOTE ADULT - ASSESSMENT
Pt is a 20F PMH anxiety and depression presenting on 5/21 with fever, cough, chills, running nose, nausea, diarrhea, abd pain x3 days, suspected to have EVALI (electronic vaping associated lung injury), now improving with steroids.

## 2020-05-27 NOTE — PROGRESS NOTE ADULT - PROBLEM SELECTOR PLAN 5
- Four days of nausea and associated vomiting. Non bloody non bilious.   - c/w zofran PRN  - Encourage PO to replete losses
Pt on klonopin 1mg daily PRN and is noncompliant with lexapro 20mg daily  - Continue klonopin 1mg daily PRN

## 2020-05-27 NOTE — PROGRESS NOTE ADULT - PROBLEM SELECTOR PLAN 3
- CXR revealing of a left lower lobe consolidative process with a blunted CP angle that may be reflective of an associated effusion. At this time, cannot exclude viral pneumonia  - At this time, will cover patient with Cef/Azithro for a period of 7 days and de-escalate as necessary   - F/u RVP to rule out atypical causes of pneumonia   - F/u Urine legionella and Strep Pneumo   - Consider D/cing Azithro if atypical causes are ruled out   - Obtain sputum culture if patient with productive cough and narrow accordingly
Multiple documented cases of EVALI presenting with GI sxs. Given diarrhea prior to onset of abx, low suspicion for other infectious causes. No further work up at this time.  - Pt reports four days of loose stools, non-bloody with no mucus--> With no diarrhea overnight or today. Will continue to monitor-if with resumption of diarhea will send c.diff as patient on multiple abx- although with benign GI exam.   - GI PCR negative  - f/u stool culture  - Continue to monitor

## 2020-05-27 NOTE — CHART NOTE - NSCHARTNOTEFT_GEN_A_CORE
Upon Nutritional Assessment by the Registered Dietitian your patient was determined to meet criteria / has evidence of the following diagnosis/diagnoses:          [x]  Mild Protein Calorie Malnutrition        [ ]  Moderate Protein Calorie Malnutrition        [ ] Severe Protein Calorie Malnutrition        [ ] Unspecified Protein Calorie Malnutrition        [ ] Underweight / BMI <19        [ ] Morbid Obesity / BMI > 40      Findings as based on:  •  Comprehensive nutrition assessment and consultation  +Wt loss d/t limited PO intake and GI distress.  pounds PTA, wt of 134 pounds noted 5/21 which suggests wt loss of 6 pounds / 4.2% body wt loss (significant).  >> During admission pt had been NPO/ on clear liquid diet 5/21-5/23 + 5/25-5/26 (inadequate diet x ~5days).  >> Pt now ordered for regular diet + Ensure enlive x3/day. Pt reports limited intake during admission. Pt has not been taking ensure, does not like and reports will not consume.    Treatment/The following diet has been recommended:  Please see RD note 5/27       PROVIDER Section:     By signing this assessment you are acknowledging and agree with the diagnosis/diagnoses assigned by the Registered Dietitian    Comments:

## 2020-05-28 ENCOUNTER — TRANSCRIPTION ENCOUNTER (OUTPATIENT)
Age: 20
End: 2020-05-28

## 2020-05-28 VITALS — OXYGEN SATURATION: 99 % | RESPIRATION RATE: 20 BRPM | HEART RATE: 105 BPM

## 2020-05-28 PROBLEM — F41.9 ANXIETY DISORDER, UNSPECIFIED: Chronic | Status: ACTIVE | Noted: 2020-05-21

## 2020-05-28 LAB
ANION GAP SERPL CALC-SCNC: 10 MMOL/L — SIGNIFICANT CHANGE UP (ref 5–17)
AUTO DIFF PNL BLD: NEGATIVE — SIGNIFICANT CHANGE UP
BUN SERPL-MCNC: 9 MG/DL — SIGNIFICANT CHANGE UP (ref 7–23)
C-ANCA SER-ACNC: NEGATIVE — SIGNIFICANT CHANGE UP
CALCIUM SERPL-MCNC: 8.6 MG/DL — SIGNIFICANT CHANGE UP (ref 8.4–10.5)
CHLORIDE SERPL-SCNC: 98 MMOL/L — SIGNIFICANT CHANGE UP (ref 96–108)
CO2 SERPL-SCNC: 29 MMOL/L — SIGNIFICANT CHANGE UP (ref 22–31)
CREAT SERPL-MCNC: 0.64 MG/DL — SIGNIFICANT CHANGE UP (ref 0.5–1.3)
GLUCOSE SERPL-MCNC: 132 MG/DL — HIGH (ref 70–99)
HCT VFR BLD CALC: 39.8 % — SIGNIFICANT CHANGE UP (ref 34.5–45)
HGB BLD-MCNC: 12.9 G/DL — SIGNIFICANT CHANGE UP (ref 11.5–15.5)
MAGNESIUM SERPL-MCNC: 2.2 MG/DL — SIGNIFICANT CHANGE UP (ref 1.6–2.6)
MCHC RBC-ENTMCNC: 30.5 PG — SIGNIFICANT CHANGE UP (ref 27–34)
MCHC RBC-ENTMCNC: 32.4 GM/DL — SIGNIFICANT CHANGE UP (ref 32–36)
MCV RBC AUTO: 94.1 FL — SIGNIFICANT CHANGE UP (ref 80–100)
NRBC # BLD: 0 /100 WBCS — SIGNIFICANT CHANGE UP (ref 0–0)
P-ANCA SER-ACNC: NEGATIVE — SIGNIFICANT CHANGE UP
PHOSPHATE SERPL-MCNC: 2.2 MG/DL — LOW (ref 2.5–4.5)
PLATELET # BLD AUTO: 540 K/UL — HIGH (ref 150–400)
POTASSIUM SERPL-MCNC: 3.8 MMOL/L — SIGNIFICANT CHANGE UP (ref 3.5–5.3)
POTASSIUM SERPL-SCNC: 3.8 MMOL/L — SIGNIFICANT CHANGE UP (ref 3.5–5.3)
RBC # BLD: 4.23 M/UL — SIGNIFICANT CHANGE UP (ref 3.8–5.2)
RBC # FLD: 12.8 % — SIGNIFICANT CHANGE UP (ref 10.3–14.5)
SODIUM SERPL-SCNC: 137 MMOL/L — SIGNIFICANT CHANGE UP (ref 135–145)
WBC # BLD: 13.61 K/UL — HIGH (ref 3.8–10.5)
WBC # FLD AUTO: 13.61 K/UL — HIGH (ref 3.8–10.5)

## 2020-05-28 PROCEDURE — 36415 COLL VENOUS BLD VENIPUNCTURE: CPT

## 2020-05-28 PROCEDURE — 87798 DETECT AGENT NOS DNA AMP: CPT

## 2020-05-28 PROCEDURE — 85652 RBC SED RATE AUTOMATED: CPT

## 2020-05-28 PROCEDURE — 93306 TTE W/DOPPLER COMPLETE: CPT

## 2020-05-28 PROCEDURE — 87045 FECES CULTURE AEROBIC BACT: CPT

## 2020-05-28 PROCEDURE — 84702 CHORIONIC GONADOTROPIN TEST: CPT

## 2020-05-28 PROCEDURE — 82962 GLUCOSE BLOOD TEST: CPT

## 2020-05-28 PROCEDURE — 85379 FIBRIN DEGRADATION QUANT: CPT

## 2020-05-28 PROCEDURE — 86769 SARS-COV-2 COVID-19 ANTIBODY: CPT

## 2020-05-28 PROCEDURE — 87040 BLOOD CULTURE FOR BACTERIA: CPT

## 2020-05-28 PROCEDURE — 87046 STOOL CULTR AEROBIC BACT EA: CPT

## 2020-05-28 PROCEDURE — 71045 X-RAY EXAM CHEST 1 VIEW: CPT

## 2020-05-28 PROCEDURE — 86431 RHEUMATOID FACTOR QUANT: CPT

## 2020-05-28 PROCEDURE — 80048 BASIC METABOLIC PNL TOTAL CA: CPT

## 2020-05-28 PROCEDURE — 82550 ASSAY OF CK (CPK): CPT

## 2020-05-28 PROCEDURE — 86038 ANTINUCLEAR ANTIBODIES: CPT

## 2020-05-28 PROCEDURE — 85610 PROTHROMBIN TIME: CPT

## 2020-05-28 PROCEDURE — U0003: CPT

## 2020-05-28 PROCEDURE — 87507 IADNA-DNA/RNA PROBE TQ 12-25: CPT

## 2020-05-28 PROCEDURE — 82728 ASSAY OF FERRITIN: CPT

## 2020-05-28 PROCEDURE — 96365 THER/PROPH/DIAG IV INF INIT: CPT

## 2020-05-28 PROCEDURE — 87641 MR-STAPH DNA AMP PROBE: CPT

## 2020-05-28 PROCEDURE — 85025 COMPLETE CBC W/AUTO DIFF WBC: CPT

## 2020-05-28 PROCEDURE — 86200 CCP ANTIBODY: CPT

## 2020-05-28 PROCEDURE — 96375 TX/PRO/DX INJ NEW DRUG ADDON: CPT

## 2020-05-28 PROCEDURE — 84100 ASSAY OF PHOSPHORUS: CPT

## 2020-05-28 PROCEDURE — 99285 EMERGENCY DEPT VISIT HI MDM: CPT | Mod: 25

## 2020-05-28 PROCEDURE — 85730 THROMBOPLASTIN TIME PARTIAL: CPT

## 2020-05-28 PROCEDURE — 82803 BLOOD GASES ANY COMBINATION: CPT

## 2020-05-28 PROCEDURE — 85027 COMPLETE CBC AUTOMATED: CPT

## 2020-05-28 PROCEDURE — 84145 PROCALCITONIN (PCT): CPT

## 2020-05-28 PROCEDURE — 86901 BLOOD TYPING SEROLOGIC RH(D): CPT

## 2020-05-28 PROCEDURE — 87581 M.PNEUMON DNA AMP PROBE: CPT

## 2020-05-28 PROCEDURE — 86850 RBC ANTIBODY SCREEN: CPT

## 2020-05-28 PROCEDURE — 87635 SARS-COV-2 COVID-19 AMP PRB: CPT

## 2020-05-28 PROCEDURE — 83690 ASSAY OF LIPASE: CPT

## 2020-05-28 PROCEDURE — 87633 RESP VIRUS 12-25 TARGETS: CPT

## 2020-05-28 PROCEDURE — 87389 HIV-1 AG W/HIV-1&-2 AB AG IA: CPT

## 2020-05-28 PROCEDURE — 80053 COMPREHEN METABOLIC PANEL: CPT

## 2020-05-28 PROCEDURE — 99231 SBSQ HOSP IP/OBS SF/LOW 25: CPT

## 2020-05-28 PROCEDURE — 83605 ASSAY OF LACTIC ACID: CPT

## 2020-05-28 PROCEDURE — 87486 CHLMYD PNEUM DNA AMP PROBE: CPT

## 2020-05-28 PROCEDURE — 80307 DRUG TEST PRSMV CHEM ANLYZR: CPT

## 2020-05-28 PROCEDURE — 83880 ASSAY OF NATRIURETIC PEPTIDE: CPT

## 2020-05-28 PROCEDURE — 87086 URINE CULTURE/COLONY COUNT: CPT

## 2020-05-28 PROCEDURE — 86235 NUCLEAR ANTIGEN ANTIBODY: CPT

## 2020-05-28 PROCEDURE — 87449 NOS EACH ORGANISM AG IA: CPT

## 2020-05-28 PROCEDURE — 83735 ASSAY OF MAGNESIUM: CPT

## 2020-05-28 PROCEDURE — 81001 URINALYSIS AUTO W/SCOPE: CPT

## 2020-05-28 PROCEDURE — 84484 ASSAY OF TROPONIN QUANT: CPT

## 2020-05-28 PROCEDURE — 86140 C-REACTIVE PROTEIN: CPT

## 2020-05-28 PROCEDURE — 99239 HOSP IP/OBS DSCHRG MGMT >30: CPT | Mod: GC

## 2020-05-28 PROCEDURE — 93005 ELECTROCARDIOGRAM TRACING: CPT

## 2020-05-28 RX ORDER — CLONAZEPAM 1 MG
1 TABLET ORAL
Qty: 0 | Refills: 0 | DISCHARGE

## 2020-05-28 RX ORDER — ESCITALOPRAM OXALATE 10 MG/1
1 TABLET, FILM COATED ORAL
Qty: 0 | Refills: 0 | DISCHARGE

## 2020-05-28 RX ADMIN — ENOXAPARIN SODIUM 40 MILLIGRAM(S): 100 INJECTION SUBCUTANEOUS at 06:50

## 2020-05-28 RX ADMIN — Medication 650 MILLIGRAM(S): at 03:05

## 2020-05-28 RX ADMIN — Medication 40 MILLIGRAM(S): at 06:49

## 2020-05-28 NOTE — PROGRESS NOTE ADULT - ATTENDING COMMENTS
20 year old female with asthma with anxiety/depression with history of waving and marijuana smoking was admitted with cough, fever and SOB. CT chest showed b/l opacities without focal consolidation. D/D atypical pneumonia vs inhalational injury due to waving.  Plan:  - Continue azithromycin and ceftriaxone  - Hold off on IVF fluid for now (pleural effusion on ultrasound)  - Restart diet and DVT prophylaxis. No plan for bronchoscopy at present.  - Plan for bronchoscopy if no improvement in next 72 hours or deterioration of symptoms.  - ID consultation  - COVID negative. Transfer patient out of COVID ICU.
Pt is awake and alert and on 2 L Nc. RR is in high 18-22 and in no distress. Afebrile and CXR markedly improved. Steroid taper per Pulm. Ambulate today on RA.
Pt less dyspneic on exam and with no further desaturations with speaking. Temps down and steroids seem to be associated clinical improvement. continue plans per Pulm for need for further diagnostic eval ie Bronch and abx per ID. Trial of NC today. Lungs clear, no rash or edema.
20 year old female with asthma with anxiety/depression with history of waving and marijuana smoking was admitted with cough, fever and SOB. CT chest showed b/l opacities without focal consolidation. D/D atypical pneumonia vs inhalational injury due to waving.  Plan:  - Add azithromycin. D/c vancomycin and zosyn. Add ceftriaxone  - Hold off on fluid for now (pleural effusion on ultrasound)  - Plan for bronchoscopy if no improvement in next 72 hours or deterioration of symptoms.  - Hold of on steroid for now.
Patient seen and examined with house-staff during bedside rounds  Resident note read, including vitals, physical findings, laboratory data, and radiological reports.   Revisions included below.  Case discussed with House staff  Direct personal management at bedside  and extensive interpretation of data. Decision making of high complexity.
Reviewed with pulmonary service. The CXR is worse. Although shunt not appreciably worse and WBC/CRP have plateaued and the fever curve is unchanged overall we feel that a trial of steroids is warranted to treat for EVALI, less likely eosinophilic pneumonitis. Will start solumedrol 30 mg IV Q12h. Bronchoscopy still being considered.
Clinical hx and course all c/w vaping related lung injury.  Improving with steroids (and time). Would favor discontinuing antibiotics soon if continues to improve and cultures negative.
Hypoxemia resolved, minor cough. Discharge today
Off antibiotics and oxygen, much better.  Continue prednisone for total 10 d course.
Patient seen and examined at bedside. Agree with exam, a/p as above with the following addendum/edits:     20 year old F w/ 4-5 days of high fevers (as high as 104), cough, shortness of breath, initially seen at OSH, discharged on Doxy/Augmentin w/ negative COVID there, p/w same symptoms to [facility] last night. COVID preliminarily negative, labs and findings more concerning for bacterial infection. Being treated for CAP w/ azithro/ceftriaxone, leukocytosis increased slightly overnight, was briefly hypoxic after a coughing fit, and spiked temp to 102. On exam, she is ill appearing, fatigued, lungs w/ diffuse rhonchi, had another coughing fit during exam (however unable to produce sputum), abd w/ mild tenderness, LE w/o edema. Will obtain CT to further evaluate lungs (no peripheral opacities seen on CXR), c/w ceftriaxone for now given overall fever curve is downtrending, duonebs, IVF. Obtain sputum culture if possible. CRP is trending down. Will monitor closely. If worsens will escalate abx.

## 2020-05-28 NOTE — DISCHARGE NOTE PROVIDER - HOSPITAL COURSE
#Discharge: do not delete        Patient is a 21 yo F with PMHx of asthma, anxiety and depression presents to the ED with fever, cough, chills, running nose, nausea, diarrhea, abd pain for four days, found to have EVALI (electric vaping         Problem List/Main Diagnoses (system-based):             New medications: prednisone taper for 6 days    Labs to be followed outpatient: Cbc    Exam to be followed outpatient: Respiratory #Discharge: do not delete        Patient is a 19 yo F with PMHx of asthma, anxiety, PTSD, and depression presents to the ED with fever, cough, chills, running nose, nausea, diarrhea, abd pain for four days, found to have EVALI (electronic vaping associated lung injury),         Problem List/Main Diagnoses (system-based):             New medications: prednisone taper for 6 days    Labs to be followed outpatient: Cbc    Exam to be followed outpatient: Respiratory #Discharge: do not delete        Patient is a 19 yo F with PMHx of asthma, anxiety, PTSD, and depression presents to the ED with fever, cough, chills, running nose, nausea, diarrhea, abd pain for four days, found to have EVALI (electronic vaping associated lung injury), improved after initiation of steroids.         Problem List/Main Diagnoses (system-based):     1) EVALI (electronic vaping associated lung injury)- Patient presented with fevers/chills, cough and increased leukocytosis. CT chest showing significant ground glass opacities. Pulmonology consulted. Patient initially in MICU requiring HFNC. Patient ultimately endorsing daily vaping and given patient not improving with antibiotics (started for suspected bacterial pneumonia), started on IV steroids. Patient began to improve quickly and was weaned to RA. Transiently treated with doxycycline for possible tick-related disorder given risk factors per ID recommendations, but discontinued as patient started improving on steroids and low suspicion for tick borne disease. Transitioned to po steroids an discharged on a 10 dose taper (6 more days.) Patient set to follow up with pulmonology and instructed to immediately get in touch with pulmonology office if she should become short of breath or chest pain worsens.        2) Bacterial pneumonia- Treated with 7 day course of ceftriaxone, 5 day course of azithromycin with clinical and laboratory improvement.        3) Vomiting/Diarrhea- Suspect this was related to EVALI. GI pcr negative. Resolved after initiation of steroids. On day of discharge, patient without symptoms for 48 hours and tolerating solid food.        New medications: prednisone taper for 6 days    Labs to be followed outpatient: Cbc    Exam to be followed outpatient: Respiratory

## 2020-05-28 NOTE — DISCHARGE NOTE PROVIDER - NSDCCPCAREPLAN_GEN_ALL_CORE_FT
PRINCIPAL DISCHARGE DIAGNOSIS  Diagnosis: Vaping-related disorder  Assessment and Plan of Treatment: You came into the hospital because you were experiencing fevers/chills, shortness of breath, vomiting and diarrhea. After a thorough work up and involving the Pulmonology and Infectious Disease teams in your care, we suspect that the majority of your symptoms are related to something called Electronic Vaping Associated Lung Injury. CT imaging of your lungs showed severe lung injury. You improved on IV steroids and we were able to transition you from IV to oral steroids and were able to wean you off of the High Flow Nasal Cannula you required back to breathing on room air. Please continue to take the oral steroids for 6 more days at the dosages prescribed (30mg for 2 days, 20mg for 2 days, and then 10mg for 2 days.) We highly recommend that you stop all vaping as you have severe lung injury from it (and it caused your vomiting an diarrhea symptoms as well.) Please make sure to follow up with a pulmonologist in 7 to 10 days. If you should begin to feel worsening shortness of breath, chest pain, or fevers/chills, please immediately reach out to your primary care doctor or to the pulmonologist we provided you with. We also sent three days of hycodan to the pharmacy for you to help you with your cough.      SECONDARY DISCHARGE DIAGNOSES  Diagnosis: Bacterial pneumonia  Assessment and Plan of Treatment: You also had a bacterial pneumonia (overlying lung infection) that we treated with antibiotics while you were in the hospital.

## 2020-05-28 NOTE — DISCHARGE NOTE PROVIDER - PROVIDER TOKENS
FREE:[LAST:[Joao],FIRST:[Saravanan],PHONE:[phone]],FAX:[phone]   [phone]    -],ADDRESS:[[address] [place] [address]],SCHEDULEDAPPT:[06/05/2020],SCHEDULEDAPPTTIME:[01:30 PM]]

## 2020-05-28 NOTE — DISCHARGE NOTE NURSING/CASE MANAGEMENT/SOCIAL WORK - PATIENT PORTAL LINK FT
You can access the FollowMyHealth Patient Portal offered by [facility] by registering at the following website: [email or web address]. By joining [employer]’s FollowMyHealth portal, you will also be able to view your health information using other applications (apps) compatible with our system.

## 2020-05-28 NOTE — DISCHARGE NOTE PROVIDER - NSDCMRMEDTOKEN_GEN_ALL_CORE_FT
KlonoPIN 1 mg oral tablet: 1 tab(s) orally once a day, As Needed KlonoPIN 1 mg oral tablet: 1 tab(s) orally once a day, As Needed  predniSONE 20 mg oral tablet: Please take as follows:  30mg (3 pills) on 5/29 and 5/30  20mg (2 pills) on 5/31 and 6/1  10mg (1 pill) on 6/2 and 6/3

## 2020-05-28 NOTE — DISCHARGE NOTE NURSING/CASE MANAGEMENT/SOCIAL WORK - NSDCFUADDAPPT_GEN_ALL_CORE_FT
You need to follow up with a pulmonologist within 7-10 days after your hospital discharge. We scheduled you for an appointment with Dr. Shepherd. If you are able to find a pulmonologist closer to where you live, please call to cancel this scheduled appointment. Please bring your discharge paperwork with you.

## 2020-05-28 NOTE — PROGRESS NOTE ADULT - REASON FOR ADMISSION
Pneumonia, r/o COVID-19

## 2020-05-28 NOTE — PROGRESS NOTE ADULT - SUBJECTIVE AND OBJECTIVE BOX
Pulm Progress note:   No significant overnight events.  On RA  cough better but still present.    MEDICATIONS  (STANDING):  acetaminophen   Tablet .. 650 milliGRAM(s) Oral every 6 hours  ALBUTerol    90 MICROgram(s) HFA Inhaler 1 Puff(s) Inhalation every 6 hours  enoxaparin Injectable 40 milliGRAM(s) SubCutaneous every 24 hours  multivitamin 1 Tablet(s) Oral daily  predniSONE   Tablet 40 milliGRAM(s) Oral every 24 hours    MEDICATIONS  (PRN):  clonazePAM  Tablet 1 milliGRAM(s) Oral daily PRN anxiety  hydrocodone/homatropine Syrup 5 milliLiter(s) Oral every 4 hours PRN Cough  promethazine 25 milliGRAM(s) Oral every 6 hours PRN nausea and vomiting  Allergies    No Known Allergies    Intolerances      Vital Signs Last 24 Hrs  T(C): 36.6 (28 May 2020 05:35), Max: 36.8 (27 May 2020 12:00)  T(F): 97.9 (28 May 2020 05:35), Max: 98.3 (27 May 2020 12:00)  HR: 94 (28 May 2020 05:35) (56 - 94)  BP: 130/84 (28 May 2020 05:35) (103/55 - 134/82)  BP(mean): 86 (27 May 2020 12:00) (73 - 92)  RR: 20 (28 May 2020 05:35) (18 - 35)  SpO2: 96% (28 May 2020 05:35) (95% - 98%)    PHYSICAL EXAM:  General: comfortable at rest  Respiratory: Clear to auscultation bilaterally without wheezing, rhonchi or rales  Cardiovascular: Regular rate and rhythm. S1 and S2 Normal; No murmurs, gallops or rubs  Gastrointestinal: Soft, noepigastric tenderness, non-distended; Normal bowel sounds  Extremities: Normal range of motion, No clubbing, cyanosis or edema  Neurological: Alert and oriented x3  Skin: Warm and dry. No obvious rash    LABS:                        12.5   22.40 )-----------( 575      ( 27 May 2020 06:41 )             39.4     05-27    137  |  99  |  8   ----------------------------<  108<H>  3.8   |  29  |  0.56    Ca    9.1      27 May 2020 06:41  Phos  3.1     05-27  Mg     2.4     05-27    TPro  6.1  /  Alb  3.0<L>  /  TBili  0.2  /  DBili  x   /  AST  22  /  ALT  16  /  AlkPhos  70  05-27.

## 2020-05-28 NOTE — DISCHARGE NOTE PROVIDER - NSDCFUADDAPPT_GEN_ALL_CORE_FT
You need to follow up with a pulmonologist within 7-10 days after your hospital discharge. We scheduled you for an appointment with Dr. hSepherd. If you are able to find a pulmonologist closer to where you live, please call to cancel this scheduled appointment. Please bring your discharge paperwork with you.

## 2020-05-28 NOTE — PROGRESS NOTE ADULT - PROBLEM SELECTOR PROBLEM 1
Acute hypoxemic respiratory failure
Sepsis
Vaping-related disorder

## 2020-05-28 NOTE — PROGRESS NOTE ADULT - ASSESSMENT
19 yo F with hx of anxiety and depression, p/w acute hypoxic respiratory failure and diarrhea associated with leukocytosis and fever, diffuse GGO on CT chest, concerning for EVALI in setting of multiple negative COVID swab.     Acute hypoxemic respiratory failure 2/2 EVALI : resolved     Presented with high grade fevers, high inflammatory markers and diffuse GGO on CT chest. COVID negative multiple times. Hx of vaping concerning for EVALI.   Initially treated with broad spectrum antibiotics but continued to worsen needing HFNC.   Now much improved with steroids , on RA with drastic improvement in clinical and radiological picture. Now on Room air.     - Discharge on Prednisone taper. currently on 40 mg.  decrease by 10 mg every other day.   - She hopes to find a Pulmonologist close to her home, we will try to get her an appointment at [address] [place] [address] in a week time ,if she couldn't find Pulmonologist for her self.   - Asked her to watch out for worsening SOB , cough and fever with steroid taper, this might be an indication that she needs a longer taper.   - c/w hycodan for cough.   - Anxiety is well controlled.  - OOBTC and incentive spirometry.   - She promise to stay away from smoking or vaping. 21 yo F with hx of anxiety and depression, p/w acute hypoxic respiratory failure and diarrhea associated with leukocytosis and fever, diffuse GGO on CT chest, concerning for EVALI in setting of multiple negative COVID swab.     Acute hypoxemic respiratory failure 2/2 EVALI : resolved     Presented with high grade fevers, high inflammatory markers and diffuse GGO on CT chest. COVID negative multiple times. Hx of vaping concerning for EVALI.   Initially treated with broad spectrum antibiotics but continued to worsen needing HFNC.   Now much improved with steroids , on RA with drastic improvement in clinical and radiological picture. Now on Room air.     - Discharge on Prednisone taper. currently on 40 mg.  decrease by 10 mg every other day.   - She hopes to find a Pulmonologist close to her home, we will try to get her an appointment at [address] [place] [address] in a week time ,if she couldn't find Pulmonologist for her self.   - Asked her to watch out for worsening SOB , cough and fever with steroid taper, this might be an indication that she needs a longer taper.   - c/w hycodan for cough.   - Anxiety is well controlled.  - OOBTC and incentive spirometry.   - She promises to stay away from smoking or vaping.

## 2020-05-28 NOTE — DISCHARGE NOTE PROVIDER - CARE PROVIDER_API CALL
Saravanan Shepherd  [address] [place] [address]  Phone: [phone]  Fax: (   )    -  Scheduled Appointment: 06/05/2020 01:30 PM

## 2020-05-29 LAB
A PHAGOCYTOPH DNA BLD QL NAA+PROBE: NEGATIVE — SIGNIFICANT CHANGE UP
CULTURE RESULTS: SIGNIFICANT CHANGE UP
CULTURE RESULTS: SIGNIFICANT CHANGE UP
E CHAFFEENSIS DNA BLD QL NAA+PROBE: NEGATIVE — SIGNIFICANT CHANGE UP
E EWINGII DNA SPEC QL NAA+PROBE: NEGATIVE — SIGNIFICANT CHANGE UP
EHRLICHIA DNA SPEC QL NAA+PROBE: NEGATIVE — SIGNIFICANT CHANGE UP
SPECIMEN SOURCE: SIGNIFICANT CHANGE UP
SPECIMEN SOURCE: SIGNIFICANT CHANGE UP

## 2020-05-31 DIAGNOSIS — T38.0X5A ADVERSE EFFECT OF GLUCOCORTICOIDS AND SYNTHETIC ANALOGUES, INITIAL ENCOUNTER: ICD-10-CM

## 2020-05-31 DIAGNOSIS — E87.3 ALKALOSIS: ICD-10-CM

## 2020-05-31 DIAGNOSIS — R82.4 ACETONURIA: ICD-10-CM

## 2020-05-31 DIAGNOSIS — R94.31 ABNORMAL ELECTROCARDIOGRAM [ECG] [EKG]: ICD-10-CM

## 2020-05-31 DIAGNOSIS — F41.8 OTHER SPECIFIED ANXIETY DISORDERS: ICD-10-CM

## 2020-05-31 DIAGNOSIS — E87.2 ACIDOSIS: ICD-10-CM

## 2020-05-31 DIAGNOSIS — J15.9 UNSPECIFIED BACTERIAL PNEUMONIA: ICD-10-CM
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
## 2020-05-31 DIAGNOSIS — R11.10 VOMITING, UNSPECIFIED: ICD-10-CM

## 2020-05-31 DIAGNOSIS — T40.7X1A POISONING BY CANNABIS (DERIVATIVES), ACCIDENTAL (UNINTENTIONAL), INITIAL ENCOUNTER: ICD-10-CM

## 2020-05-31 DIAGNOSIS — A41.9 SEPSIS, UNSPECIFIED ORGANISM: ICD-10-CM

## 2020-05-31 DIAGNOSIS — R19.7 DIARRHEA, UNSPECIFIED: ICD-10-CM

## 2020-05-31 DIAGNOSIS — J69.1 PNEUMONITIS DUE TO INHALATION OF OILS AND ESSENCES: ICD-10-CM
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
## 2020-05-31 DIAGNOSIS — F43.10 POST-TRAUMATIC STRESS DISORDER, UNSPECIFIED: ICD-10-CM

## 2020-05-31 DIAGNOSIS — D72.829 ELEVATED WHITE BLOOD CELL COUNT, UNSPECIFIED: ICD-10-CM

## 2020-05-31 DIAGNOSIS — E44.1 MILD PROTEIN-CALORIE MALNUTRITION: ICD-10-CM
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
## 2020-05-31 DIAGNOSIS — F12.180 CANNABIS ABUSE WITH CANNABIS-INDUCED ANXIETY DISORDER: ICD-10-CM

## 2020-05-31 DIAGNOSIS — U07.0 VAPING-RELATED DISORDER: ICD-10-CM

## 2020-05-31 DIAGNOSIS — D64.9 ANEMIA, UNSPECIFIED: ICD-10-CM

## 2020-05-31 DIAGNOSIS — J18.9 PNEUMONIA, UNSPECIFIED ORGANISM: ICD-10-CM

## 2020-05-31 DIAGNOSIS — J45.909 UNSPECIFIED ASTHMA, UNCOMPLICATED: ICD-10-CM

## 2020-05-31 DIAGNOSIS — J96.01 ACUTE RESPIRATORY FAILURE WITH HYPOXIA: ICD-10-CM

## 2020-05-31 DIAGNOSIS — E87.1 HYPO-OSMOLALITY AND HYPONATREMIA: ICD-10-CM

## 2020-05-31 DIAGNOSIS — Z91.14 PATIENT'S OTHER NONCOMPLIANCE WITH MEDICATION REGIMEN: ICD-10-CM

## 2020-06-05 ENCOUNTER — APPOINTMENT (OUTPATIENT)
Dept: PULMONOLOGY | Facility: CLINIC | Age: 20
End: 2020-06-05
Payer: MEDICAID

## 2020-06-05 VITALS
TEMPERATURE: 98.4 F | SYSTOLIC BLOOD PRESSURE: 108 MMHG | BODY MASS INDEX: 21.79 KG/M2 | HEART RATE: 85 BPM | OXYGEN SATURATION: 97 % | WEIGHT: 123 LBS | DIASTOLIC BLOOD PRESSURE: 70 MMHG | HEIGHT: 63 IN

## 2020-06-05 DIAGNOSIS — Z87.891 PERSONAL HISTORY OF NICOTINE DEPENDENCE: ICD-10-CM

## 2020-06-05 DIAGNOSIS — J45.909 UNSPECIFIED ASTHMA, UNCOMPLICATED: ICD-10-CM

## 2020-06-05 DIAGNOSIS — Z80.9 FAMILY HISTORY OF MALIGNANT NEOPLASM, UNSPECIFIED: ICD-10-CM

## 2020-06-05 DIAGNOSIS — F41.9 ANXIETY DISORDER, UNSPECIFIED: ICD-10-CM

## 2020-06-05 DIAGNOSIS — F43.10 POST-TRAUMATIC STRESS DISORDER, UNSPECIFIED: ICD-10-CM

## 2020-06-05 DIAGNOSIS — Z81.8 FAMILY HISTORY OF OTHER MENTAL AND BEHAVIORAL DISORDERS: ICD-10-CM

## 2020-06-05 DIAGNOSIS — Z82.3 FAMILY HISTORY OF STROKE: ICD-10-CM

## 2020-06-05 DIAGNOSIS — U07.0 VAPING-RELATED DISORDER: ICD-10-CM

## 2020-06-05 DIAGNOSIS — F32.9 ANXIETY DISORDER, UNSPECIFIED: ICD-10-CM

## 2020-06-05 DIAGNOSIS — Z83.3 FAMILY HISTORY OF DIABETES MELLITUS: ICD-10-CM

## 2020-06-05 DIAGNOSIS — Z82.49 FAMILY HISTORY OF ISCHEMIC HEART DISEASE AND OTHER DISEASES OF THE CIRCULATORY SYSTEM: ICD-10-CM

## 2020-06-05 PROCEDURE — 71046 X-RAY EXAM CHEST 2 VIEWS: CPT

## 2020-06-05 PROCEDURE — 99214 OFFICE O/P EST MOD 30 MIN: CPT | Mod: 25

## 2020-06-05 RX ORDER — CLONAZEPAM 1 MG/1
1 TABLET ORAL
Refills: 0 | Status: ACTIVE | COMMUNITY
Start: 2020-06-05

## 2020-06-05 NOTE — PHYSICAL EXAM
[No Acute Distress] : no acute distress [Normal Appearance] : normal appearance [Normal Oropharynx] : normal oropharynx [No Neck Mass] : no neck mass [Normal Rate/Rhythm] : normal rate/rhythm [Normal S1, S2] : normal s1, s2 [No Murmurs] : no murmurs [No Resp Distress] : no resp distress [No Abnormalities] : no abnormalities [Benign] : benign [Clear to Auscultation Bilaterally] : clear to auscultation bilaterally [Normal Gait] : normal gait [No Cyanosis] : no cyanosis [No Clubbing] : no clubbing [FROM] : FROM [No Edema] : no edema [Oriented x3] : oriented x3 [No Focal Deficits] : no focal deficits [Normal Color/ Pigmentation] : normal color/ pigmentation [Normal Affect] : normal affect

## 2020-06-07 NOTE — HISTORY OF PRESENT ILLNESS
[Former] : former [TextBox_4] : 20 yr old female with PMH of asthma, anxiety/ / Depression, Anorexia and PTSD.  Pt here for pulmonary consult post [facility] urgent care visits on March 9,2020 and  hospital admission at [facility] from May 21 to May 28, 2020.  \par \par Pt was admitted for PNA, COVID 19 negative. She presented to [facility] with fever, chills, cough runny nose, nausea, diarrhea and abdominal pain for 4 days.  Presented with increased leukocytosis.  \par \par Pt was found to have EVALI (electronic vaping associated lung injury) CT showed significant ground glass opacities.  She required MICU care and HFNC.  PT given AntibiOtic (doxycycline for tick related disorder, 7 days ceftriaxone, 5 days azithromycin) and was started on Iv steroids, which improved her condition and was weaned to RA.  She was discharged on a tampered dosage of prednisone.  \par \par She stopped vaping since her hospital stay. She was weak and SOB the first couple day post hospitalization but has improved.  She is trying to gain weight and lost about 20 lbs in hospital. She is eating well and trying to eat extra calories.  She is not feeling SOB going up stairs. No fever, wheezing, coughing or wheezing. she is off prednisone without any complaints of SOB off steroids. She is able to do 2000 vs 500 on the incentive spirometer.

## 2020-06-07 NOTE — ASSESSMENT
[FreeTextEntry1] :  EVALI (electronic vaping associated lung injury) CT showed significant ground glass opacities.  She required MICU care and HFNC.  PT given AntibiOtic (doxycycline for tick related disorder, 7 days ceftriaxone, 5 days azithromycin) and was started on Iv steroids, which improved her condition and was weaned to RA.  She was discharged on a tampered dosage of prednisone.  \par \par Pt is currently off steroids and asymptomatic.  CXR performed in office today clear and resolution of bilateral opacities seen on prior CXR from hospitalization.  Pt stopped vaping and encouraged smoke free. Pt to notify me of any worsening in her condition.   \par \par

## 2023-04-10 ENCOUNTER — NON-APPOINTMENT (OUTPATIENT)
Age: 23
End: 2023-04-10

## 2023-04-28 NOTE — DISCHARGE NOTE PROVIDER - NSDCDCMDCOMP_GEN_ALL_CORE
CERTIFICATE OF RETURN TO WORK    Jessie 10, 2019      Re:   Muna Brannon  [address] [place] [address]  [place] [address]                      This is to certify that uMna Brannon was seen on 6/10/2019 and can return to regular work on June 11, 2019.    RESTRICTIONS: No more than 10 hours per shift.  No Saturdays.      REMARKS: None.        SIGNATURE:___________________________________________,   6/10/2019      Kyara Pittman, MSN, [address] [place] [address]  T [phone]  F [phone]  
Home
This document is complete and the patient is ready for discharge.

## 2023-06-14 NOTE — ED PROVIDER NOTE - TOBACCO USE
Enbrel Pregnancy And Lactation Text: This medication is Pregnancy Category B and is considered safe during pregnancy. It is unknown if this medication is excreted in breast milk. Unknown if ever smoked

## 2023-11-02 ENCOUNTER — APPOINTMENT (OUTPATIENT)
Dept: INTERNAL MEDICINE | Facility: CLINIC | Age: 23
End: 2023-11-02

## 2023-11-30 ENCOUNTER — APPOINTMENT (OUTPATIENT)
Dept: INTERNAL MEDICINE | Facility: CLINIC | Age: 23
End: 2023-11-30
Payer: COMMERCIAL

## 2023-11-30 ENCOUNTER — LABORATORY RESULT (OUTPATIENT)
Age: 23
End: 2023-11-30

## 2023-11-30 VITALS
HEART RATE: 96 BPM | BODY MASS INDEX: 25.52 KG/M2 | SYSTOLIC BLOOD PRESSURE: 124 MMHG | DIASTOLIC BLOOD PRESSURE: 72 MMHG | RESPIRATION RATE: 15 BRPM | HEIGHT: 60 IN | WEIGHT: 130 LBS | OXYGEN SATURATION: 99 %

## 2023-11-30 DIAGNOSIS — Z00.00 ENCOUNTER FOR GENERAL ADULT MEDICAL EXAMINATION W/OUT ABNORMAL FINDINGS: ICD-10-CM

## 2023-11-30 DIAGNOSIS — G25.81 RESTLESS LEGS SYNDROME: ICD-10-CM

## 2023-11-30 DIAGNOSIS — L70.9 ACNE, UNSPECIFIED: ICD-10-CM

## 2023-11-30 DIAGNOSIS — E53.8 DEFICIENCY OF OTHER SPECIFIED B GROUP VITAMINS: ICD-10-CM

## 2023-11-30 PROCEDURE — G0444 DEPRESSION SCREEN ANNUAL: CPT | Mod: 59

## 2023-11-30 PROCEDURE — 36415 COLL VENOUS BLD VENIPUNCTURE: CPT

## 2023-11-30 PROCEDURE — 99385 PREV VISIT NEW AGE 18-39: CPT | Mod: 25

## 2023-11-30 RX ORDER — VENLAFAXINE HYDROCHLORIDE 150 MG/1
150 CAPSULE, EXTENDED RELEASE ORAL DAILY
Qty: 90 | Refills: 0 | Status: ACTIVE | COMMUNITY
Start: 2023-11-30

## 2023-11-30 RX ORDER — GABAPENTIN 300 MG/1
300 CAPSULE ORAL AT BEDTIME
Qty: 90 | Refills: 0 | Status: ACTIVE | COMMUNITY
Start: 2023-11-30

## 2023-11-30 RX ORDER — LITHIUM CARBONATE 150 MG/1
150 CAPSULE ORAL DAILY
Qty: 90 | Refills: 0 | Status: ACTIVE | COMMUNITY
Start: 2023-11-30

## 2023-11-30 RX ORDER — ISOTRETINOIN 30 MG/1
30 CAPSULE ORAL DAILY
Qty: 9 | Refills: 0 | Status: ACTIVE | COMMUNITY
Start: 2023-11-30

## 2023-11-30 RX ORDER — ESCITALOPRAM OXALATE 20 MG/1
20 TABLET, FILM COATED ORAL DAILY
Refills: 0 | Status: DISCONTINUED | COMMUNITY
Start: 2020-06-05 | End: 2023-11-30

## 2023-11-30 RX ORDER — VENLAFAXINE HYDROCHLORIDE 37.5 MG/1
37.5 CAPSULE, EXTENDED RELEASE ORAL
Qty: 90 | Refills: 0 | Status: ACTIVE | COMMUNITY
Start: 2023-11-30

## 2023-11-30 RX ORDER — QUETIAPINE FUMARATE 25 MG/1
25 TABLET ORAL DAILY
Qty: 90 | Refills: 0 | Status: ACTIVE | COMMUNITY
Start: 2023-11-30

## 2023-12-04 PROBLEM — E53.8 VITAMIN B12 DEFICIENCY: Status: ACTIVE | Noted: 2023-11-30

## 2023-12-04 PROBLEM — L70.9 ACNE: Status: ACTIVE | Noted: 2023-11-30

## 2023-12-04 PROBLEM — G25.81 RESTLESS LEG SYNDROME: Status: ACTIVE | Noted: 2023-11-30

## 2023-12-07 LAB
ALBUMIN SERPL ELPH-MCNC: 4.7 G/DL
ALP BLD-CCNC: 61 U/L
ALT SERPL-CCNC: 8 U/L
ANION GAP SERPL CALC-SCNC: 12 MMOL/L
AST SERPL-CCNC: 20 U/L
BASOPHILS # BLD AUTO: 0.01 K/UL
BASOPHILS NFR BLD AUTO: 0.1 %
BILIRUB SERPL-MCNC: 0.2 MG/DL
BUN SERPL-MCNC: 13 MG/DL
C TRACH RRNA SPEC QL NAA+PROBE: NOT DETECTED
CALCIUM SERPL-MCNC: 9.6 MG/DL
CHLORIDE SERPL-SCNC: 106 MMOL/L
CHOLEST SERPL-MCNC: 182 MG/DL
CO2 SERPL-SCNC: 25 MMOL/L
CREAT SERPL-MCNC: 0.7 MG/DL
CREAT SPEC-SCNC: 40 MG/DL
EGFR: 124 ML/MIN/1.73M2
EOSINOPHIL # BLD AUTO: 0 K/UL
EOSINOPHIL NFR BLD AUTO: 0 %
ESTIMATED AVERAGE GLUCOSE: 94 MG/DL
GLUCOSE SERPL-MCNC: 93 MG/DL
HBA1C MFR BLD HPLC: 4.9 %
HBV SURFACE AG SER QL: NONREACTIVE
HCT VFR BLD CALC: 44.5 %
HCV AB SER QL: NONREACTIVE
HCV S/CO RATIO: 0.12 S/CO
HDLC SERPL-MCNC: 57 MG/DL
HGB BLD-MCNC: 14.6 G/DL
HIV1+2 AB SPEC QL IA.RAPID: NONREACTIVE
HSV 1 IGG TYPE-SPECIFIC AB: 27.3 INDEX
HSV 2 IGG TYPE-SPECIFIC AB: 1.35 INDEX
IMM GRANULOCYTES NFR BLD AUTO: 0.3 %
LDLC SERPL CALC-MCNC: 95 MG/DL
LYMPHOCYTES # BLD AUTO: 2.34 K/UL
LYMPHOCYTES NFR BLD AUTO: 31 %
MAN DIFF?: NORMAL
MCHC RBC-ENTMCNC: 30.5 PG
MCHC RBC-ENTMCNC: 32.8 GM/DL
MCV RBC AUTO: 93.1 FL
MICROALBUMIN 24H UR DL<=1MG/L-MCNC: <1.2 MG/DL
MICROALBUMIN/CREAT 24H UR-RTO: NORMAL MG/G
MONOCYTES # BLD AUTO: 0.38 K/UL
MONOCYTES NFR BLD AUTO: 5 %
MYCOPLASMA GENITALIUM PCR: NEGATIVE
MYCOPLASMA GENITALIUM SRCE: NORMAL
N GONORRHOEA RRNA SPEC QL NAA+PROBE: NOT DETECTED
NEUTROPHILS # BLD AUTO: 4.79 K/UL
NEUTROPHILS NFR BLD AUTO: 63.6 %
NONHDLC SERPL-MCNC: 125 MG/DL
PLATELET # BLD AUTO: 319 K/UL
POTASSIUM SERPL-SCNC: 4.8 MMOL/L
PROT SERPL-MCNC: 7.4 G/DL
RBC # BLD: 4.78 M/UL
RBC # FLD: 12.7 %
SODIUM SERPL-SCNC: 143 MMOL/L
SOURCE AMPLIFICATION: NORMAL
SOURCE AMPLIFICATION: NORMAL
T PALLIDUM AB SER QL IA: NEGATIVE
T VAGINALIS RRNA SPEC QL NAA+PROBE: NOT DETECTED
TRIGL SERPL-MCNC: 176 MG/DL
VIT B12 SERPL-MCNC: 348 PG/ML
WBC # FLD AUTO: 7.54 K/UL

## 2023-12-08 LAB
APPEARANCE: CLEAR
BACTERIA: NEGATIVE /HPF
BILIRUBIN URINE: NEGATIVE
BLOOD URINE: NEGATIVE
CAST: 0 /LPF
COLOR: YELLOW
EPITHELIAL CELLS: 4 /HPF
GLUCOSE QUALITATIVE U: NEGATIVE MG/DL
KETONES URINE: NEGATIVE MG/DL
LEUKOCYTE ESTERASE URINE: ABNORMAL
MICROSCOPIC-UA: NORMAL
NITRITE URINE: NEGATIVE
PH URINE: 7.5
PROTEIN URINE: NEGATIVE MG/DL
RED BLOOD CELLS URINE: 0 /HPF
SPECIFIC GRAVITY URINE: 1.01
UROBILINOGEN URINE: 0.2 MG/DL
WHITE BLOOD CELLS URINE: 2 /HPF

## 2024-07-09 ENCOUNTER — APPOINTMENT (OUTPATIENT)
Age: 24
End: 2024-07-09

## 2024-10-10 ENCOUNTER — NON-APPOINTMENT (OUTPATIENT)
Age: 24
End: 2024-10-10

## 2024-10-11 ENCOUNTER — APPOINTMENT (OUTPATIENT)
Dept: PULMONOLOGY | Facility: CLINIC | Age: 24
End: 2024-10-11
Payer: COMMERCIAL

## 2024-10-11 VITALS
DIASTOLIC BLOOD PRESSURE: 70 MMHG | SYSTOLIC BLOOD PRESSURE: 116 MMHG | BODY MASS INDEX: 24.8 KG/M2 | HEIGHT: 63 IN | HEART RATE: 88 BPM | TEMPERATURE: 97.2 F | WEIGHT: 140 LBS | OXYGEN SATURATION: 99 %

## 2024-10-11 DIAGNOSIS — F17.290 NICOTINE DEPENDENCE, OTHER TOBACCO PRODUCT, UNCOMPLICATED: ICD-10-CM

## 2024-10-11 DIAGNOSIS — Z78.9 OTHER SPECIFIED HEALTH STATUS: ICD-10-CM

## 2024-10-11 DIAGNOSIS — J90 PLEURAL EFFUSION, NOT ELSEWHERE CLASSIFIED: ICD-10-CM

## 2024-10-11 DIAGNOSIS — Z87.09 PERSONAL HISTORY OF OTHER DISEASES OF THE RESPIRATORY SYSTEM: ICD-10-CM

## 2024-10-11 PROCEDURE — 99203 OFFICE O/P NEW LOW 30 MIN: CPT

## 2024-10-11 RX ORDER — ALBUTEROL SULFATE 90 UG/1
108 (90 BASE) INHALANT RESPIRATORY (INHALATION)
Refills: 0 | Status: ACTIVE | COMMUNITY

## 2024-11-04 NOTE — ED ADULT TRIAGE NOTE - MODE OF ARRIVAL
Problem: PROBLEM: INCREASED SUICIDAL IDEATIONS WITHOUT ABILITY TO SAFETY PLAN  Goal: LTG: Decrease risk of suicide by increasing insight on recent ideation/attempt, develop coping strategies, and identify medication needs-Enter Patient Stated \"Not to be suicidal any more\"  Outcome: Monitoring/Evaluating progress  Goal: Nursing STG: Reported reduced suicide risk for 72 hours as indicated in the [place] Suicide Severity Rating Scale  Outcome: Monitoring/Evaluating progress  Goal: Therapy STG: Identify circumstances and triggers that led to recent suicide ideation/attempt as indicated by completion of psychosocial and step 1 of [employer] Safety Plan  Outcome: Monitoring/Evaluating progress  Goal: Therapy STG: Decrease reported suicidal ideations as indicated by \"not at all\" response on question 9 on PHQ9  Outcome: Monitoring/Evaluating progress     Problem: PROBLEM: INCREASED SUICIDAL IDEATIONS WITHOUT ABILITY TO SAFETY PLAN  Goal: LTG: Decrease risk of suicide by increasing insight on recent ideation/attempt, develop coping strategies, and identify medication needs-Enter Patient Stated \"Not to be suicidal any more\"  11/4/2024 0820 by Jeannette Gonzales RN  Outcome: Monitoring/Evaluating progress  11/4/2024 0820 by Jeannette Gonzales RN  Outcome: Monitoring/Evaluating progress  Goal: Nursing STG: Reported reduced suicide risk for 72 hours as indicated in the [place] Suicide Severity Rating Scale  11/4/2024 0820 by Jeannette Gonzales RN  Outcome: Monitoring/Evaluating progress  11/4/2024 0820 by Jeannette Gonzales RN  Outcome: Monitoring/Evaluating progress  Goal: Therapy STG: Identify circumstances and triggers that led to recent suicide ideation/attempt as indicated by completion of psychosocial and step 1 of [employer] Safety Plan  11/4/2024 0820 by Jeannette Gonzales RN  Outcome: Monitoring/Evaluating progress  11/4/2024 0820 by Jeannette Gonzales RN  Outcome: Monitoring/Evaluating  progress  Goal: Therapy STG: Decrease reported suicidal ideations as indicated by \"not at all\" response on question 9 on PHQ9  11/4/2024 0820 by Jeannette Gonzales, RN  Outcome: Monitoring/Evaluating progress  11/4/2024 0820 by Jeannette Gonzales, RN  Outcome: Monitoring/Evaluating progress      Walk in

## 2024-11-11 ENCOUNTER — APPOINTMENT (OUTPATIENT)
Dept: PULMONOLOGY | Facility: CLINIC | Age: 24
End: 2024-11-11

## 2024-12-25 ENCOUNTER — NON-APPOINTMENT (OUTPATIENT)
Age: 24
End: 2024-12-25

## 2025-03-09 ENCOUNTER — NON-APPOINTMENT (OUTPATIENT)
Age: 25
End: 2025-03-09

## 2025-06-23 ENCOUNTER — APPOINTMENT (OUTPATIENT)
Age: 25
End: 2025-06-23
Payer: COMMERCIAL

## 2025-06-23 ENCOUNTER — TRANSCRIPTION ENCOUNTER (OUTPATIENT)
Age: 25
End: 2025-06-23

## 2025-06-23 VITALS
SYSTOLIC BLOOD PRESSURE: 119 MMHG | OXYGEN SATURATION: 98 % | BODY MASS INDEX: 23.5 KG/M2 | HEART RATE: 84 BPM | RESPIRATION RATE: 14 BRPM | HEIGHT: 63 IN | TEMPERATURE: 98.4 F | WEIGHT: 132.6 LBS | DIASTOLIC BLOOD PRESSURE: 76 MMHG

## 2025-06-23 PROBLEM — Z12.83 SKIN CANCER SCREENING: Status: ACTIVE | Noted: 2025-06-23

## 2025-06-23 PROCEDURE — 99395 PREV VISIT EST AGE 18-39: CPT

## 2025-06-23 PROCEDURE — 36415 COLL VENOUS BLD VENIPUNCTURE: CPT

## 2025-06-24 LAB
25(OH)D3 SERPL-MCNC: 61.2 NG/ML
ALBUMIN SERPL ELPH-MCNC: 4.5 G/DL
ALP BLD-CCNC: 54 U/L
ALT SERPL-CCNC: 14 U/L
ANION GAP SERPL CALC-SCNC: 13 MMOL/L
APPEARANCE: CLEAR
AST SERPL-CCNC: 22 U/L
BASOPHILS # BLD AUTO: 0 K/UL
BASOPHILS NFR BLD AUTO: 0 %
BILIRUB SERPL-MCNC: 0.2 MG/DL
BILIRUBIN URINE: NEGATIVE
BLOOD URINE: NEGATIVE
BUN SERPL-MCNC: 10 MG/DL
CALCIUM SERPL-MCNC: 9.9 MG/DL
CHLORIDE SERPL-SCNC: 102 MMOL/L
CHOLEST SERPL-MCNC: 150 MG/DL
CO2 SERPL-SCNC: 23 MMOL/L
COLOR: YELLOW
CREAT SERPL-MCNC: 0.68 MG/DL
CREAT SPEC-SCNC: 140 MG/DL
EGFRCR SERPLBLD CKD-EPI 2021: 123 ML/MIN/1.73M2
EOSINOPHIL # BLD AUTO: 0.01 K/UL
EOSINOPHIL NFR BLD AUTO: 0.2 %
ESTIMATED AVERAGE GLUCOSE: 100 MG/DL
FERRITIN SERPL-MCNC: 74 NG/ML
FOLATE SERPL-MCNC: 6.9 NG/ML
GLUCOSE QUALITATIVE U: NEGATIVE MG/DL
GLUCOSE SERPL-MCNC: 69 MG/DL
HBA1C MFR BLD HPLC: 5.1 %
HCT VFR BLD CALC: 42.8 %
HDLC SERPL-MCNC: 56 MG/DL
HGB BLD-MCNC: 13.9 G/DL
IMM GRANULOCYTES NFR BLD AUTO: 0.2 %
IRON SATN MFR SERPL: 31 %
IRON SERPL-MCNC: 89 UG/DL
KETONES URINE: NEGATIVE MG/DL
LDLC SERPL-MCNC: 76 MG/DL
LEUKOCYTE ESTERASE URINE: NEGATIVE
LYMPHOCYTES # BLD AUTO: 1.92 K/UL
LYMPHOCYTES NFR BLD AUTO: 43 %
MAGNESIUM SERPL-MCNC: 2.1 MG/DL
MAN DIFF?: NORMAL
MCHC RBC-ENTMCNC: 31.5 PG
MCHC RBC-ENTMCNC: 32.5 G/DL
MCV RBC AUTO: 97.1 FL
MICROALBUMIN 24H UR DL<=1MG/L-MCNC: <1.2 MG/DL
MICROALBUMIN/CREAT 24H UR-RTO: NORMAL MG/G
MONOCYTES # BLD AUTO: 0.34 K/UL
MONOCYTES NFR BLD AUTO: 7.6 %
NEUTROPHILS # BLD AUTO: 2.18 K/UL
NEUTROPHILS NFR BLD AUTO: 49 %
NITRITE URINE: NEGATIVE
NONHDLC SERPL-MCNC: 95 MG/DL
PH URINE: 6
PLATELET # BLD AUTO: 224 K/UL
POTASSIUM SERPL-SCNC: 5.1 MMOL/L
PROT SERPL-MCNC: 6.7 G/DL
PROTEIN URINE: NEGATIVE MG/DL
RBC # BLD: 4.41 M/UL
RBC # FLD: 13.4 %
SODIUM SERPL-SCNC: 139 MMOL/L
SPECIFIC GRAVITY URINE: 1.02
TIBC SERPL-MCNC: 283 UG/DL
TRIGL SERPL-MCNC: 103 MG/DL
TSH SERPL-ACNC: 0.76 UIU/ML
UIBC SERPL-MCNC: 194 UG/DL
UROBILINOGEN URINE: 0.2 MG/DL
VIT B12 SERPL-MCNC: 329 PG/ML
WBC # FLD AUTO: 4.46 K/UL

## 2025-08-04 ENCOUNTER — APPOINTMENT (OUTPATIENT)
Age: 25
End: 2025-08-04
Payer: COMMERCIAL

## 2025-08-04 VITALS
HEIGHT: 63 IN | SYSTOLIC BLOOD PRESSURE: 125 MMHG | HEART RATE: 90 BPM | TEMPERATURE: 97.4 F | DIASTOLIC BLOOD PRESSURE: 78 MMHG | RESPIRATION RATE: 16 BRPM | WEIGHT: 132 LBS | OXYGEN SATURATION: 98 % | BODY MASS INDEX: 23.39 KG/M2

## 2025-08-04 DIAGNOSIS — G25.81 RESTLESS LEGS SYNDROME: ICD-10-CM

## 2025-08-04 DIAGNOSIS — E53.8 DEFICIENCY OF OTHER SPECIFIED B GROUP VITAMINS: ICD-10-CM

## 2025-08-04 PROCEDURE — 99213 OFFICE O/P EST LOW 20 MIN: CPT
